# Patient Record
Sex: MALE | Race: BLACK OR AFRICAN AMERICAN | NOT HISPANIC OR LATINO | Employment: OTHER | URBAN - METROPOLITAN AREA
[De-identification: names, ages, dates, MRNs, and addresses within clinical notes are randomized per-mention and may not be internally consistent; named-entity substitution may affect disease eponyms.]

---

## 2017-05-24 ENCOUNTER — HOSPITAL ENCOUNTER (OUTPATIENT)
Dept: LAB | Facility: MEDICAL CENTER | Age: 47
End: 2017-05-24
Attending: INTERNAL MEDICINE
Payer: COMMERCIAL

## 2017-05-24 LAB
ALT SERPL-CCNC: 43 U/L (ref 2–50)
CHOLEST SERPL-MCNC: 118 MG/DL (ref 100–199)
HDLC SERPL-MCNC: 52 MG/DL
LDLC SERPL CALC-MCNC: 54 MG/DL
TRIGL SERPL-MCNC: 58 MG/DL (ref 0–149)

## 2017-05-24 PROCEDURE — 80061 LIPID PANEL: CPT

## 2017-05-24 PROCEDURE — 36415 COLL VENOUS BLD VENIPUNCTURE: CPT

## 2017-05-24 PROCEDURE — 84460 ALANINE AMINO (ALT) (SGPT): CPT

## 2017-10-03 ENCOUNTER — HOSPITAL ENCOUNTER (OUTPATIENT)
Dept: LAB | Facility: MEDICAL CENTER | Age: 47
End: 2017-10-03
Attending: INTERNAL MEDICINE
Payer: COMMERCIAL

## 2017-10-03 LAB — GFR SERPL CREATININE-BSD FRML MDRD: >60 ML/MIN/1.73 M 2

## 2017-10-03 PROCEDURE — 80053 COMPREHEN METABOLIC PANEL: CPT

## 2017-10-03 PROCEDURE — 80061 LIPID PANEL: CPT

## 2017-10-03 PROCEDURE — 83036 HEMOGLOBIN GLYCOSYLATED A1C: CPT

## 2017-10-03 PROCEDURE — 84403 ASSAY OF TOTAL TESTOSTERONE: CPT

## 2017-10-03 PROCEDURE — 85025 COMPLETE CBC W/AUTO DIFF WBC: CPT

## 2017-10-03 PROCEDURE — 84153 ASSAY OF PSA TOTAL: CPT

## 2017-10-03 PROCEDURE — 84402 ASSAY OF FREE TESTOSTERONE: CPT

## 2017-10-03 PROCEDURE — 83704 LIPOPROTEIN BLD QUAN PART: CPT

## 2017-10-03 PROCEDURE — 36415 COLL VENOUS BLD VENIPUNCTURE: CPT

## 2017-10-03 PROCEDURE — 84270 ASSAY OF SEX HORMONE GLOBUL: CPT

## 2017-10-04 LAB
ALBUMIN SERPL BCP-MCNC: 4.4 G/DL (ref 3.2–4.9)
ALBUMIN/GLOB SERPL: 1.4 G/DL
ALP SERPL-CCNC: 47 U/L (ref 30–99)
ALT SERPL-CCNC: 25 U/L (ref 2–50)
ANION GAP SERPL CALC-SCNC: 6 MMOL/L (ref 0–11.9)
AST SERPL-CCNC: 21 U/L (ref 12–45)
BASOPHILS # BLD AUTO: 0.5 % (ref 0–1.8)
BASOPHILS # BLD: 0.02 K/UL (ref 0–0.12)
BILIRUB SERPL-MCNC: 1.6 MG/DL (ref 0.1–1.5)
BUN SERPL-MCNC: 11 MG/DL (ref 8–22)
CALCIUM SERPL-MCNC: 9.7 MG/DL (ref 8.5–10.5)
CHLORIDE SERPL-SCNC: 104 MMOL/L (ref 96–112)
CHOLEST SERPL-MCNC: 161 MG/DL (ref 100–199)
CO2 SERPL-SCNC: 28 MMOL/L (ref 20–33)
CREAT SERPL-MCNC: 0.93 MG/DL (ref 0.5–1.4)
EOSINOPHIL # BLD AUTO: 0.01 K/UL (ref 0–0.51)
EOSINOPHIL NFR BLD: 0.2 % (ref 0–6.9)
ERYTHROCYTE [DISTWIDTH] IN BLOOD BY AUTOMATED COUNT: 40.7 FL (ref 35.9–50)
EST. AVERAGE GLUCOSE BLD GHB EST-MCNC: 114 MG/DL
GLOBULIN SER CALC-MCNC: 3.1 G/DL (ref 1.9–3.5)
GLUCOSE SERPL-MCNC: 88 MG/DL (ref 65–99)
HBA1C MFR BLD: 5.6 % (ref 0–5.6)
HCT VFR BLD AUTO: 49.7 % (ref 42–52)
HDLC SERPL-MCNC: 61 MG/DL
HGB BLD-MCNC: 16.1 G/DL (ref 14–18)
IMM GRANULOCYTES # BLD AUTO: 0 K/UL (ref 0–0.11)
IMM GRANULOCYTES NFR BLD AUTO: 0 % (ref 0–0.9)
LDLC SERPL CALC-MCNC: 92 MG/DL
LYMPHOCYTES # BLD AUTO: 2.42 K/UL (ref 1–4.8)
LYMPHOCYTES NFR BLD: 54.5 % (ref 22–41)
MCH RBC QN AUTO: 28.1 PG (ref 27–33)
MCHC RBC AUTO-ENTMCNC: 32.4 G/DL (ref 33.7–35.3)
MCV RBC AUTO: 86.7 FL (ref 81.4–97.8)
MONOCYTES # BLD AUTO: 0.35 K/UL (ref 0–0.85)
MONOCYTES NFR BLD AUTO: 7.9 % (ref 0–13.4)
NEUTROPHILS # BLD AUTO: 1.64 K/UL (ref 1.82–7.42)
NEUTROPHILS NFR BLD: 36.9 % (ref 44–72)
NRBC # BLD AUTO: 0 K/UL
NRBC BLD AUTO-RTO: 0 /100 WBC
PLATELET # BLD AUTO: 246 K/UL (ref 164–446)
PMV BLD AUTO: 10 FL (ref 9–12.9)
POTASSIUM SERPL-SCNC: 4.6 MMOL/L (ref 3.6–5.5)
PROT SERPL-MCNC: 7.5 G/DL (ref 6–8.2)
PSA SERPL-MCNC: 2.04 NG/ML (ref 0–4)
RBC # BLD AUTO: 5.73 M/UL (ref 4.7–6.1)
SODIUM SERPL-SCNC: 138 MMOL/L (ref 135–145)
TRIGL SERPL-MCNC: 41 MG/DL (ref 0–149)
WBC # BLD AUTO: 4.4 K/UL (ref 4.8–10.8)

## 2017-10-05 LAB
SHBG SERPL-SCNC: 74 NMOL/L (ref 11–80)
TESTOST FREE MFR SERPL: 1.2 % (ref 1.6–2.9)
TESTOST FREE SERPL-MCNC: 99 PG/ML (ref 47–244)
TESTOST SERPL-MCNC: 824 NG/DL (ref 300–890)

## 2017-10-09 LAB
CHOLEST SERPL-MCNC: 171 MG/DL
HDL PARTICAL NO Q4363: ABNORMAL
HDL SIZE Q4361: 9.3 NM
HDLC SERPL-MCNC: 66 MG/DL (ref 40–59)
HLD.LARGE SERPL-SCNC: 7.8 UMOL/L
L VLDL PART NO Q4357: <1.5 NMOL/L
LDL SERPL QN: 21.2 NM
LDL SERPL-SCNC: 1008 NMOL/L
LDL SMALL SERPL-SCNC: 313 NMOL/L
LDLC SERPL CALC-MCNC: 96 MG/DL
TRIGL SERPL-MCNC: 45 MG/DL (ref 30–149)
VLDL SIZE Q4362: ABNORMAL

## 2020-05-15 ENCOUNTER — HOSPITAL ENCOUNTER (OUTPATIENT)
Facility: MEDICAL CENTER | Age: 50
End: 2020-05-15
Attending: FAMILY MEDICINE
Payer: COMMERCIAL

## 2020-05-15 LAB
ALBUMIN SERPL BCP-MCNC: 4.1 G/DL (ref 3.2–4.9)
ALBUMIN/GLOB SERPL: 1.6 G/DL
ALP SERPL-CCNC: 54 U/L (ref 30–99)
ALT SERPL-CCNC: 23 U/L (ref 2–50)
ANION GAP SERPL CALC-SCNC: 14 MMOL/L (ref 7–16)
AST SERPL-CCNC: 25 U/L (ref 12–45)
BILIRUB SERPL-MCNC: 0.9 MG/DL (ref 0.1–1.5)
BUN SERPL-MCNC: 11 MG/DL (ref 8–22)
CALCIUM SERPL-MCNC: 9.2 MG/DL (ref 8.5–10.5)
CHLORIDE SERPL-SCNC: 105 MMOL/L (ref 96–112)
CHOLEST SERPL-MCNC: 138 MG/DL (ref 100–199)
CO2 SERPL-SCNC: 22 MMOL/L (ref 20–33)
CREAT SERPL-MCNC: 0.92 MG/DL (ref 0.5–1.4)
EST. AVERAGE GLUCOSE BLD GHB EST-MCNC: 123 MG/DL
FOLATE SERPL-MCNC: 17.5 NG/ML
GLOBULIN SER CALC-MCNC: 2.5 G/DL (ref 1.9–3.5)
GLUCOSE SERPL-MCNC: 102 MG/DL (ref 65–99)
HBA1C MFR BLD: 5.9 % (ref 0–5.6)
HDLC SERPL-MCNC: 57 MG/DL
LDLC SERPL CALC-MCNC: 70 MG/DL
POTASSIUM SERPL-SCNC: 4.3 MMOL/L (ref 3.6–5.5)
PROT SERPL-MCNC: 6.6 G/DL (ref 6–8.2)
PSA SERPL-MCNC: 6.81 NG/ML (ref 0–4)
SODIUM SERPL-SCNC: 141 MMOL/L (ref 135–145)
TRIGL SERPL-MCNC: 53 MG/DL (ref 0–149)
TSH SERPL DL<=0.005 MIU/L-ACNC: 1.52 UIU/ML (ref 0.38–5.33)
VIT B12 SERPL-MCNC: 1411 PG/ML (ref 211–911)

## 2020-05-15 PROCEDURE — 84153 ASSAY OF PSA TOTAL: CPT

## 2020-05-15 PROCEDURE — 80053 COMPREHEN METABOLIC PANEL: CPT

## 2020-05-15 PROCEDURE — 83721 ASSAY OF BLOOD LIPOPROTEIN: CPT

## 2020-05-15 PROCEDURE — 82746 ASSAY OF FOLIC ACID SERUM: CPT

## 2020-05-15 PROCEDURE — 83036 HEMOGLOBIN GLYCOSYLATED A1C: CPT

## 2020-05-15 PROCEDURE — 82607 VITAMIN B-12: CPT

## 2020-05-15 PROCEDURE — 84443 ASSAY THYROID STIM HORMONE: CPT

## 2020-05-15 PROCEDURE — 80061 LIPID PANEL: CPT

## 2020-05-18 LAB — LDLC SERPL-MCNC: 76 MG/DL (ref 0–129)

## 2020-06-05 ENCOUNTER — HOSPITAL ENCOUNTER (OUTPATIENT)
Dept: LAB | Facility: MEDICAL CENTER | Age: 50
End: 2020-06-05
Attending: UROLOGY
Payer: COMMERCIAL

## 2020-06-05 LAB — PSA SERPL-MCNC: 3.02 NG/ML (ref 0–4)

## 2020-06-05 PROCEDURE — 84153 ASSAY OF PSA TOTAL: CPT

## 2020-07-15 ENCOUNTER — HOSPITAL ENCOUNTER (OUTPATIENT)
Dept: LAB | Facility: MEDICAL CENTER | Age: 50
End: 2020-07-15
Attending: PHYSICIAN ASSISTANT
Payer: COMMERCIAL

## 2020-07-15 ENCOUNTER — HOSPITAL ENCOUNTER (OUTPATIENT)
Dept: LAB | Facility: MEDICAL CENTER | Age: 50
End: 2020-07-15
Attending: UROLOGY
Payer: COMMERCIAL

## 2020-07-15 LAB
ANION GAP SERPL CALC-SCNC: 11 MMOL/L (ref 7–16)
BASOPHILS # BLD AUTO: 0.2 % (ref 0–1.8)
BASOPHILS # BLD: 0.01 K/UL (ref 0–0.12)
BUN SERPL-MCNC: 13 MG/DL (ref 8–22)
CALCIUM SERPL-MCNC: 9.4 MG/DL (ref 8.5–10.5)
CHLORIDE SERPL-SCNC: 107 MMOL/L (ref 96–112)
CO2 SERPL-SCNC: 25 MMOL/L (ref 20–33)
CREAT SERPL-MCNC: 1.21 MG/DL (ref 0.5–1.4)
EOSINOPHIL # BLD AUTO: 0.05 K/UL (ref 0–0.51)
EOSINOPHIL NFR BLD: 1.1 % (ref 0–6.9)
ERYTHROCYTE [DISTWIDTH] IN BLOOD BY AUTOMATED COUNT: 45.6 FL (ref 35.9–50)
GLUCOSE SERPL-MCNC: 81 MG/DL (ref 65–99)
HCT VFR BLD AUTO: 48.3 % (ref 42–52)
HGB BLD-MCNC: 15.5 G/DL (ref 14–18)
IMM GRANULOCYTES # BLD AUTO: 0 K/UL (ref 0–0.11)
IMM GRANULOCYTES NFR BLD AUTO: 0 % (ref 0–0.9)
LYMPHOCYTES # BLD AUTO: 2.14 K/UL (ref 1–4.8)
LYMPHOCYTES NFR BLD: 48.1 % (ref 22–41)
MCH RBC QN AUTO: 28.9 PG (ref 27–33)
MCHC RBC AUTO-ENTMCNC: 32.1 G/DL (ref 33.7–35.3)
MCV RBC AUTO: 90.1 FL (ref 81.4–97.8)
MONOCYTES # BLD AUTO: 0.5 K/UL (ref 0–0.85)
MONOCYTES NFR BLD AUTO: 11.2 % (ref 0–13.4)
NEUTROPHILS # BLD AUTO: 1.75 K/UL (ref 1.82–7.42)
NEUTROPHILS NFR BLD: 39.4 % (ref 44–72)
NRBC # BLD AUTO: 0 K/UL
NRBC BLD-RTO: 0 /100 WBC
PLATELET # BLD AUTO: 244 K/UL (ref 164–446)
PMV BLD AUTO: 10.5 FL (ref 9–12.9)
POTASSIUM SERPL-SCNC: 4.4 MMOL/L (ref 3.6–5.5)
RBC # BLD AUTO: 5.36 M/UL (ref 4.7–6.1)
SODIUM SERPL-SCNC: 143 MMOL/L (ref 135–145)
WBC # BLD AUTO: 4.5 K/UL (ref 4.8–10.8)

## 2020-07-15 PROCEDURE — 87086 URINE CULTURE/COLONY COUNT: CPT

## 2020-07-15 PROCEDURE — 80048 BASIC METABOLIC PNL TOTAL CA: CPT

## 2020-07-15 PROCEDURE — 85025 COMPLETE CBC W/AUTO DIFF WBC: CPT

## 2020-07-18 LAB
BACTERIA UR CULT: NORMAL
SIGNIFICANT IND 70042: NORMAL
SITE SITE: NORMAL
SOURCE SOURCE: NORMAL

## 2020-07-21 ENCOUNTER — HOSPITAL ENCOUNTER (OUTPATIENT)
Facility: MEDICAL CENTER | Age: 50
End: 2020-07-21
Attending: FAMILY MEDICINE
Payer: COMMERCIAL

## 2020-07-21 LAB
AMBIGUOUS DTTM AMBI4: NORMAL
APTT PPP: 29.7 SEC (ref 24.7–36)
INR PPP: 1 (ref 0.87–1.13)
PROTHROMBIN TIME: 13.5 SEC (ref 12–14.6)

## 2020-07-21 PROCEDURE — 85730 THROMBOPLASTIN TIME PARTIAL: CPT

## 2020-07-21 PROCEDURE — 85610 PROTHROMBIN TIME: CPT

## 2020-10-21 ENCOUNTER — HOSPITAL ENCOUNTER (OUTPATIENT)
Facility: MEDICAL CENTER | Age: 50
End: 2020-10-21
Attending: EMERGENCY MEDICINE
Payer: COMMERCIAL

## 2020-10-21 LAB
AMBIGUOUS DTTM AMBI4: NORMAL
BASOPHILS # BLD AUTO: 0.2 % (ref 0–1.8)
BASOPHILS # BLD: 0.01 K/UL (ref 0–0.12)
EOSINOPHIL # BLD AUTO: 0.06 K/UL (ref 0–0.51)
EOSINOPHIL NFR BLD: 1.4 % (ref 0–6.9)
ERYTHROCYTE [DISTWIDTH] IN BLOOD BY AUTOMATED COUNT: 44.5 FL (ref 35.9–50)
HCT VFR BLD AUTO: 48.1 % (ref 42–52)
HGB BLD-MCNC: 15.1 G/DL (ref 14–18)
IMM GRANULOCYTES # BLD AUTO: 0.01 K/UL (ref 0–0.11)
IMM GRANULOCYTES NFR BLD AUTO: 0.2 % (ref 0–0.9)
LYMPHOCYTES # BLD AUTO: 2.46 K/UL (ref 1–4.8)
LYMPHOCYTES NFR BLD: 56.6 % (ref 22–41)
MCH RBC QN AUTO: 28.3 PG (ref 27–33)
MCHC RBC AUTO-ENTMCNC: 31.4 G/DL (ref 33.7–35.3)
MCV RBC AUTO: 90.2 FL (ref 81.4–97.8)
MONOCYTES # BLD AUTO: 0.49 K/UL (ref 0–0.85)
MONOCYTES NFR BLD AUTO: 11.3 % (ref 0–13.4)
NEUTROPHILS # BLD AUTO: 1.32 K/UL (ref 1.82–7.42)
NEUTROPHILS NFR BLD: 30.3 % (ref 44–72)
NRBC # BLD AUTO: 0 K/UL
NRBC BLD-RTO: 0 /100 WBC
PLATELET # BLD AUTO: 231 K/UL (ref 164–446)
PMV BLD AUTO: 10.7 FL (ref 9–12.9)
RBC # BLD AUTO: 5.33 M/UL (ref 4.7–6.1)
WBC # BLD AUTO: 4.4 K/UL (ref 4.8–10.8)

## 2020-10-21 PROCEDURE — 83090 ASSAY OF HOMOCYSTEINE: CPT

## 2020-10-21 PROCEDURE — 84100 ASSAY OF PHOSPHORUS: CPT

## 2020-10-21 PROCEDURE — 82670 ASSAY OF TOTAL ESTRADIOL: CPT

## 2020-10-21 PROCEDURE — 84403 ASSAY OF TOTAL TESTOSTERONE: CPT

## 2020-10-21 PROCEDURE — 80053 COMPREHEN METABOLIC PANEL: CPT

## 2020-10-21 PROCEDURE — 84270 ASSAY OF SEX HORMONE GLOBUL: CPT

## 2020-10-21 PROCEDURE — 83615 LACTATE (LD) (LDH) ENZYME: CPT

## 2020-10-21 PROCEDURE — 84481 FREE ASSAY (FT-3): CPT

## 2020-10-21 PROCEDURE — 83525 ASSAY OF INSULIN: CPT

## 2020-10-21 PROCEDURE — 80061 LIPID PANEL: CPT

## 2020-10-21 PROCEDURE — 84402 ASSAY OF FREE TESTOSTERONE: CPT

## 2020-10-21 PROCEDURE — 84439 ASSAY OF FREE THYROXINE: CPT

## 2020-10-21 PROCEDURE — 82977 ASSAY OF GGT: CPT

## 2020-10-21 PROCEDURE — 82533 TOTAL CORTISOL: CPT

## 2020-10-21 PROCEDURE — 84550 ASSAY OF BLOOD/URIC ACID: CPT

## 2020-10-21 PROCEDURE — 85025 COMPLETE CBC W/AUTO DIFF WBC: CPT

## 2020-10-21 PROCEDURE — 84443 ASSAY THYROID STIM HORMONE: CPT

## 2020-10-21 PROCEDURE — 83036 HEMOGLOBIN GLYCOSYLATED A1C: CPT

## 2020-10-21 PROCEDURE — 84153 ASSAY OF PSA TOTAL: CPT

## 2020-10-21 PROCEDURE — 82306 VITAMIN D 25 HYDROXY: CPT

## 2020-10-21 PROCEDURE — 82627 DEHYDROEPIANDROSTERONE: CPT

## 2020-10-21 PROCEDURE — 86141 C-REACTIVE PROTEIN HS: CPT

## 2020-10-21 PROCEDURE — 84305 ASSAY OF SOMATOMEDIN: CPT

## 2020-10-22 LAB
25(OH)D3 SERPL-MCNC: 44 NG/ML (ref 30–100)
ALBUMIN SERPL BCP-MCNC: 4.2 G/DL (ref 3.2–4.9)
ALBUMIN/GLOB SERPL: 1.6 G/DL
ALP SERPL-CCNC: 62 U/L (ref 30–99)
ALT SERPL-CCNC: 25 U/L (ref 2–50)
ANION GAP SERPL CALC-SCNC: 10 MMOL/L (ref 7–16)
AST SERPL-CCNC: 23 U/L (ref 12–45)
BILIRUB SERPL-MCNC: 0.9 MG/DL (ref 0.1–1.5)
BUN SERPL-MCNC: 13 MG/DL (ref 8–22)
CALCIUM SERPL-MCNC: 9 MG/DL (ref 8.5–10.5)
CHLORIDE SERPL-SCNC: 104 MMOL/L (ref 96–112)
CHOLEST SERPL-MCNC: 150 MG/DL (ref 100–199)
CO2 SERPL-SCNC: 23 MMOL/L (ref 20–33)
CORTIS SERPL-MCNC: 16.6 UG/DL (ref 0–23)
CREAT SERPL-MCNC: 0.92 MG/DL (ref 0.5–1.4)
CRP SERPL HS-MCNC: 2.8 MG/L (ref 0–7.5)
DHEA-S SERPL-MCNC: 172 UG/DL (ref 44.3–331)
EST. AVERAGE GLUCOSE BLD GHB EST-MCNC: 126 MG/DL
ESTRADIOL SERPL-MCNC: 23.9 PG/ML
FASTING STATUS PATIENT QL REPORTED: NORMAL
GGT SERPL-CCNC: 26 U/L (ref 7–51)
GLOBULIN SER CALC-MCNC: 2.6 G/DL (ref 1.9–3.5)
GLUCOSE SERPL-MCNC: 95 MG/DL (ref 65–99)
HBA1C MFR BLD: 6 % (ref 0–5.6)
HCYS SERPL-SCNC: 8.01 UMOL/L
HDLC SERPL-MCNC: 52 MG/DL
LDH SERPL L TO P-CCNC: 218 U/L (ref 107–266)
LDLC SERPL CALC-MCNC: 79 MG/DL
PHOSPHATE SERPL-MCNC: 3.6 MG/DL (ref 2.5–4.5)
POTASSIUM SERPL-SCNC: 4.1 MMOL/L (ref 3.6–5.5)
PROT SERPL-MCNC: 6.8 G/DL (ref 6–8.2)
PSA SERPL-MCNC: 1.61 NG/ML (ref 0–4)
SODIUM SERPL-SCNC: 137 MMOL/L (ref 135–145)
T3FREE SERPL-MCNC: 3.51 PG/ML (ref 2–4.4)
T4 FREE SERPL-MCNC: 1.32 NG/DL (ref 0.93–1.7)
TRIGL SERPL-MCNC: 93 MG/DL (ref 0–149)
TSH SERPL DL<=0.005 MIU/L-ACNC: 1.42 UIU/ML (ref 0.38–5.33)
URATE SERPL-MCNC: 4.7 MG/DL (ref 2.5–8.3)

## 2020-10-23 LAB
SHBG SERPL-SCNC: 53 NMOL/L (ref 11–80)
TESTOST FREE MFR SERPL: 1.5 % (ref 1.6–2.9)
TESTOST FREE SERPL-MCNC: 90 PG/ML (ref 47–244)
TESTOST SERPL-MCNC: 617 NG/DL (ref 300–890)

## 2020-10-24 LAB — INSULIN P FAST SERPL-ACNC: 4 UIU/ML (ref 3–19)

## 2020-10-25 LAB
IGF-I SERPL-MCNC: 192 NG/ML (ref 65–222)
IGF-I Z-SCORE SERPL: 1.3

## 2021-01-17 ENCOUNTER — APPOINTMENT (OUTPATIENT)
Dept: RADIOLOGY | Facility: MEDICAL CENTER | Age: 51
End: 2021-01-17
Attending: EMERGENCY MEDICINE
Payer: COMMERCIAL

## 2021-01-17 ENCOUNTER — HOSPITAL ENCOUNTER (OUTPATIENT)
Dept: RADIOLOGY | Facility: MEDICAL CENTER | Age: 51
End: 2021-01-17
Payer: COMMERCIAL

## 2021-01-17 ENCOUNTER — HOSPITAL ENCOUNTER (EMERGENCY)
Facility: MEDICAL CENTER | Age: 51
End: 2021-01-17
Attending: EMERGENCY MEDICINE
Payer: COMMERCIAL

## 2021-01-17 VITALS
OXYGEN SATURATION: 100 % | SYSTOLIC BLOOD PRESSURE: 138 MMHG | RESPIRATION RATE: 16 BRPM | DIASTOLIC BLOOD PRESSURE: 91 MMHG | TEMPERATURE: 97.2 F | HEART RATE: 51 BPM | WEIGHT: 180.12 LBS

## 2021-01-17 DIAGNOSIS — S62.101A CLOSED FRACTURE OF RIGHT WRIST, INITIAL ENCOUNTER: ICD-10-CM
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
## 2021-01-17 DIAGNOSIS — R45.2 UNHAPPINESS: ICD-10-CM

## 2021-01-17 PROCEDURE — 700102 HCHG RX REV CODE 250 W/ 637 OVERRIDE(OP): Performed by: EMERGENCY MEDICINE

## 2021-01-17 PROCEDURE — 25605 CLTX DST RDL FX/EPHYS SEP W/: CPT

## 2021-01-17 PROCEDURE — 700101 HCHG RX REV CODE 250: Performed by: EMERGENCY MEDICINE

## 2021-01-17 PROCEDURE — A9270 NON-COVERED ITEM OR SERVICE: HCPCS | Performed by: EMERGENCY MEDICINE

## 2021-01-17 PROCEDURE — 99284 EMERGENCY DEPT VISIT MOD MDM: CPT

## 2021-01-17 PROCEDURE — 73200 CT UPPER EXTREMITY W/O DYE: CPT | Mod: RT

## 2021-01-17 PROCEDURE — 302875 HCHG BANDAGE ACE 4 OR 6""

## 2021-01-17 PROCEDURE — 73110 X-RAY EXAM OF WRIST: CPT | Mod: RT

## 2021-01-17 RX ORDER — HYDROCODONE BITARTRATE AND ACETAMINOPHEN 5; 325 MG/1; MG/1
1 TABLET ORAL EVERY 6 HOURS PRN
Qty: 20 TAB | Refills: 0 | Status: SHIPPED | OUTPATIENT
Start: 2021-01-17 | End: 2021-01-22

## 2021-01-17 RX ORDER — OXYCODONE HYDROCHLORIDE AND ACETAMINOPHEN 5; 325 MG/1; MG/1
1 TABLET ORAL ONCE
Status: COMPLETED | OUTPATIENT
Start: 2021-01-17 | End: 2021-01-17

## 2021-01-17 RX ORDER — LIDOCAINE HYDROCHLORIDE 20 MG/ML
20 INJECTION, SOLUTION INFILTRATION; PERINEURAL ONCE
Status: COMPLETED | OUTPATIENT
Start: 2021-01-17 | End: 2021-01-17

## 2021-01-17 RX ADMIN — OXYCODONE AND ACETAMINOPHEN 1 TABLET: 5; 325 TABLET ORAL at 16:32

## 2021-01-17 RX ADMIN — LIDOCAINE HYDROCHLORIDE 20 ML: 20 INJECTION, SOLUTION INFILTRATION; PERINEURAL at 17:00

## 2021-01-17 ASSESSMENT — LIFESTYLE VARIABLES: DO YOU DRINK ALCOHOL: NO

## 2021-01-17 ASSESSMENT — FIBROSIS 4 INDEX: FIB4 SCORE: 1.04

## 2021-01-17 NOTE — ED TRIAGE NOTES
Pt comes in complaining of R wrist pain. Pt stating he was skiing and injured his wrist. Deformity and swelling noted.

## 2021-01-17 NOTE — ED PROVIDER NOTES
ED Provider Note    CHIEF COMPLAINT  Chief Complaint   Patient presents with   • Wrist Pain       HPI  Bhargav Pozo is a 50 y.o. male who presents to the emergency department complaint of right wrist pain and deformity.  The patient was snowboarding and caught an edge and fell on outstretched right wrist.  He was wearing a helmet, he did not hit his head or get knocked out.  Denies any injuries neck, chest, abdomen, back or other extremities.  He is not on blood thinners.  He complains of pain swelling and deformity to the right wrist.  No numbness or tingling distally.  No other injuries or complaints.  His family member who is present at bedside is an x-ray tech at the doctor's office and they took 2 view x-rays before he came here.  He has a distal radius fracture with displacement.      REVIEW OF SYSTEMS  See HPI for further details. All other systems are negative.    PAST MEDICAL HISTORY  History reviewed. No pertinent past medical history.    FAMILY HISTORY  History reviewed. No pertinent family history.    SOCIAL HISTORY  Social History     Socioeconomic History   • Marital status:      Spouse name: Not on file   • Number of children: Not on file   • Years of education: Not on file   • Highest education level: Not on file   Occupational History   • Not on file   Social Needs   • Financial resource strain: Not on file   • Food insecurity     Worry: Not on file     Inability: Not on file   • Transportation needs     Medical: Not on file     Non-medical: Not on file   Tobacco Use   • Smoking status: Never Smoker   Substance and Sexual Activity   • Alcohol use: Not Currently   • Drug use: Not Currently   • Sexual activity: Not on file   Lifestyle   • Physical activity     Days per week: Not on file     Minutes per session: Not on file   • Stress: Not on file   Relationships   • Social connections     Talks on phone: Not on file     Gets together: Not on file     Attends Buddhist service: Not on  file     Active member of club or organization: Not on file     Attends meetings of clubs or organizations: Not on file     Relationship status: Not on file   • Intimate partner violence     Fear of current or ex partner: Not on file     Emotionally abused: Not on file     Physically abused: Not on file     Forced sexual activity: Not on file   Other Topics Concern   • Not on file   Social History Narrative   • Not on file       SURGICAL HISTORY  History reviewed. No pertinent surgical history.    CURRENT MEDICATIONS  Home Medications     Reviewed by Marlys Ceja R.N. (Registered Nurse) on 01/17/21 at 1454  Med List Status: Partial   Medication Last Dose Status   Tamsulosin HCl (FLOMAX PO)  Active                ALLERGIES  Allergies   Allergen Reactions   • Codeine        PHYSICAL EXAM  VITAL SIGNS: /94   Pulse (!) 50   Temp 36.2 °C (97.2 °F) (Temporal)   Resp 18   Wt 81.7 kg (180 lb 1.9 oz)   SpO2 98%    Constitutional: Well developed, Well nourished, No acute distress, Non-toxic appearance.   HENT: Normocephalic, Atraumatic  Eyes: PERRL, EOMI, Conjunctiva normal, No discharge.   Neck: Normal range of motion  Cardiovascular: Chest is nontender to palpation  Thorax & Lungs: Normal breath sounds, No respiratory distress  Abdomen: Bowel sounds normal, Soft, No tenderness,  Skin: Warm, Dry, No erythema, No rash.   Back: No tenderness, No CVA tenderness.   Musculoskeletal: Good range of motion in all major joints.  Right wrist shows a distal dorsal and radial angulated fracture.  The skin is intact.  Normal neurovascular exam.  There is some diffuse vague numbness.  Fingers are warm and perfused.  Neurologic: Alert, No focal deficits noted.   Psychiatric: Affect normal         RADIOLOGY/PROCEDURES  CT-WRIST W/O RIGHT   Final Result      Impacted, intra-articular, mildly dorsally displaced fracture of the distal radius.      Displaced fracture of the ulna styloid.      Soft tissue swelling.          DX-WRIST-COMPLETE 3+ RIGHT   Final Result      Improved alignment of the distal radial fracture. There continues to be mild dorsal displacement.      Mildly displaced ulna styloid fracture.      Soft tissue swelling.         OUTSIDE IMAGES-DX UPPER EXTREMITY, RIGHT   Final Result            COURSE & MEDICAL DECISION MAKING  Pertinent Labs & Imaging studies reviewed. (See chart for details)    1530 :The patient presents emergency department with a displaced fracture of the right wrist.  The patient had a crash while snowboarding.  Appears otherwise uninjured  Head neck chest abdomen pelvis demonstrating signs of trauma.  Other extremities unremarkable.  Family presents with a CD of an x-ray taken at the clinic where the wife works.  This will be brought over to the film room for review.  Once this is loaded I will review this.  Did order some repeat films to see if this would be more expeditious.  These are pending.     1600: X-ray done in the clinic was provided by the family was available for review.  Which was loaded up and shows a fracture of the distal radius which is significantly displaced both dorsally and radially.  This will require reduction.  I called the orthopedic doctor determine if he would like to reduce this work if he would like me to reduce it.    1622 the orthopedic surgeon called back.  He would like me to reduce it.  I have ordered some pain medications orally prior to that I have ordered some lidocaine after my discussion with the orthopedic surgeon.  I have consented the patient for reduction.  I have discussed the case with orthopedic PA to assist me in this reduction.  He arrived in the ER promptly      1646: Procedure note: Closed reduction  Informed consent was obtained with risk benefits complications alternatives discussed including failed procedure requiring rereduction, infection, bleeding, or possible neurovascular compromise.  Verbalized understanding was agreeable to the  procedure.    Dorsum of the wrist was prepped with ChloraPrep x3.  Then total of 10 cc lidocaine without epinephrine was injected and hematoma.  Good anesthesia was obtained.    The wrist was reduced in typical fashion took a couple of attempts to get adequately straighten out to length.  Then a splint was applied with myself in the orthopedic PA with assistance.  Repeat neurovascular exam shows good perfusion and intact sensation.     1735: Repeat x-ray shows improved alignment.  Orthopedics is requesting a CT scan of the wrist.  CT scan of the wrist is ordered per orthopedic surgeon's request.  He will see the patient in follow-up.  I have reviewed the postreduction film with the orthopedic surgeon.    1900 CT of the wrist completed and is is available for review the patient is reassessed and having some paresthesias in the ulnar nerve distribution.  The CT is not yet read and finalized by the radiologist.  I have reviewed the CT waiting for radiology read.  Preparing his discharge paperwork in anticipation of being discharged.    I have reassessed the patient and his plan preparing his discharge paperwork and prescriptions and notices change in his neurologic status.  He has intact two-point discrimination despite the median nerve are normal sensation but decreased two-point discrimination and sensation in the distribution of the ulnar nerve.  I called the orthopedic surgeon at 1935.    Orthopedic surgeon responds states he will come in to see the patient but he would like me to loosen the splint.  He will see the patient.  Foot is loosened.      The patient was apparently seen by the orthopedic surgeon around 2035.  But he did not talk to me.  He left a note in the chart.  This was around 2050.  The note was reviewed the patient was cleared for discharge from his perspective.  He felt the patient could go home and follow-up.      Approximately 9 PM I went to reassess the patient and I was made aware that he had  rewrapped the Ace wrap and left in the emergency department because he was frustrated with the amount of time he spent here.  He was eloped from the ER.  He did not receive discharge instructions, or prescriptions for pain medications.    When I noticed the patient was gone around 9 PM I called him and asked him to return to the ER so we could rewrap his Ace wrap.  Explained I would like to prescribe him pain medications and given appropriate follow-up information.      The patient's case rapidly had been applied by them was very tight and poorly fitting unclear if the cast or splint had become loose or shifted.  He is requesting to leave.  I had the tech appropriately rewrapped the splint.    He is prescribed narcotics.  He is consented for narcotics.    In prescribing controlled substances to this patient, I certify that I have obtained and reviewed the medical history of Bhargav Pozo. I have also made a good jim effort to obtain applicable records from other providers who have treated the patient and records did not demonstrate any increased risk of substance abuse that would prevent me from prescribing controlled substances.     I have conducted a physical exam and documented it. I have reviewed Mr. Pozo’s prescription history as maintained by the [place] Prescription Monitoring Program.     I have assessed the patient’s risk for abuse, dependency, and addiction using the validated Opioid Risk Tool available at https://www.mdcalc.com/nhvpml-paxw-eaiw-ort-narcotic-abuse.     Given the above, I believe the benefits of controlled substance therapy outweigh the risks. The reasons for prescribing controlled substances include.  In my opinion narcotics are the only reasonable choice for pain control secondary to his fracture.  Accordingly, I have discussed the risk and benefits, treatment plan, and alternative therapies with the patient.       At this point the patient was consented for narcotics he signed  the consent form he understands a follow-up instructions.  He understands it is return for pain, numbness and weakness or other concerns.  Transfer likely need surgery presenting by orthopedics and is given appropriate discharge instructions.    Mendoza Farley M.D.  [address]  [address]  [place] [address]  [phone]    Schedule an appointment as soon as possible for a visit in 2 days          FINAL IMPRESSION  1. Closed fracture of right wrist, initial encounter  HYDROcodone-acetaminophen (NORCO) 5-325 MG Tab per tablet   2. Snowboard accident, initial encounter     3. Situational anxiety     4. Unhappiness     5.  Eloped from the ER    Addendum: was reassessed in the splint reports no paresthesia and had normal cap refill at the time of discharge.           Electronically signed by: Tho Morrison M.D., 1/17/2021 3:09 PM

## 2021-01-18 NOTE — ED NOTES
Pt seen leaving treatment room and head to ER lobby. Er tech stated pt did not want to wait for splint to be re-wrapped.

## 2021-01-18 NOTE — HOSPICE
Orthopaedic Consult Note      Bhargav Pozo is a 50 y.o. right-hand-dominant male who presents after a snowboarding accident.  He indicates that he was at [facility] today snowboarding when he fell onto an outstretched right hand.  Had immediate wrist pain.  Indicates that he has broke his wrist in the past.  Currently has intermittent numbness over the ring and small finger.  Otherwise denies any numbness and tingling into his middle index or thumb.  Denies much in the way of any motor trouble with his fingers at this point time.  Did feel like the splint was too tight at one point time but this is been loosened.  Denies chest pain shortness breath fever chills night sweats unintentional weight loss    History reviewed. No pertinent past medical history.    History reviewed. No pertinent surgical history.    Medications  No current facility-administered medications on file prior to encounter.      Current Outpatient Medications on File Prior to Encounter   Medication Sig Dispense Refill   • Tamsulosin HCl (FLOMAX PO) Take  by mouth.         Allergies  Codeine    ROS  All other systems were reviewed and found to be negative    History reviewed. No pertinent family history.    Social History     Socioeconomic History   • Marital status:      Spouse name: Not on file   • Number of children: Not on file   • Years of education: Not on file   • Highest education level: Not on file   Occupational History   • Not on file   Social Needs   • Financial resource strain: Not on file   • Food insecurity     Worry: Not on file     Inability: Not on file   • Transportation needs     Medical: Not on file     Non-medical: Not on file   Tobacco Use   • Smoking status: Never Smoker   Substance and Sexual Activity   • Alcohol use: Not Currently   • Drug use: Not Currently   • Sexual activity: Not on file   Lifestyle   • Physical activity     Days per week: Not on file     Minutes per session: Not on file   • Stress: Not on  file   Relationships   • Social connections     Talks on phone: Not on file     Gets together: Not on file     Attends Jew service: Not on file     Active member of club or organization: Not on file     Attends meetings of clubs or organizations: Not on file     Relationship status: Not on file   • Intimate partner violence     Fear of current or ex partner: Not on file     Emotionally abused: Not on file     Physically abused: Not on file     Forced sexual activity: Not on file   Other Topics Concern   • Not on file   Social History Narrative   • Not on file       Physical Exam  Vitals  /93   Pulse (!) 56   Temp 36.2 °C (97.2 °F) (Temporal)   Resp 18   Wt 81.7 kg (180 lb 1.9 oz)   SpO2 98%   General: No acute distress alert and oriented x3 cooperative breathing room air  Skin: Intact, no open wounds unless otherwise noted below  Extremities: Right upper extremity: Patient is in a splint currently.  He denies any tenderness to palpation in the elbow or shoulder.  No pain with range of motion of the shoulder.  Currently he is able to fire [employer] interosseous musculature.  Sensation is intact light touch median radial nerves.  He does have slightly diminished sensation but it is still intact over the ulnar nerve most pronounced on dorsal aspect of the fingers.  All of his fingers are warm well perfused    Radiographs:  CT-WRIST W/O RIGHT   Final Result      Impacted, intra-articular, mildly dorsally displaced fracture of the distal radius.      Displaced fracture of the ulna styloid.      Soft tissue swelling.         DX-WRIST-COMPLETE 3+ RIGHT   Final Result      Improved alignment of the distal radial fracture. There continues to be mild dorsal displacement.      Mildly displaced ulna styloid fracture.      Soft tissue swelling.         OUTSIDE IMAGES-DX UPPER EXTREMITY, RIGHT   Final Result          Laboratory Values      No results for input(s): SODIUM, POTASSIUM, CHLORIDE, CO2, GLUCOSE, BUN,  CPKTOTAL in the last 72 hours.          Impression:    1.  Right distal radius fracture    Plan:    At this point I am not concerned with any ominous neuropraxia's or emerging neurological issues.  His ulnar sensation seems to be resolving according to him and he felt that his splint was too tight when it was placed.  It is still intact but slightly diminished over the dorsal aspect.  His interosseous function is good at this point time.  Because of this he is welcome to follow-up with me this week in office.  He should remain nonweightbearing in the splint at all times with the extremity elevated to help decrease swelling.  Call for an appointment

## 2021-01-18 NOTE — DISCHARGE INSTRUCTIONS
Keep your arm in the splint, use sling for comfort.  No driving on pain meds were explained.  Return for pain or other concerns.

## 2021-01-18 NOTE — ED NOTES
MD aware pt left before treatment complete and called pt via phone. Pt to return to have splint re-wrapped.    same name as above

## 2021-01-18 NOTE — ED NOTES
Pt and pt visitor expressed frustrations over extended treatment time. This RN explained to visitor that eta for consulting physician is unknown and asked if there was anything staff could do to help with the wait times. Pt visitor declined at this time.

## 2021-01-18 NOTE — ED NOTES
Pt c/o numbness to 4th and 5th digits on right hand post-splint. MD in room to re-eval. Ace wrap re-wrapped w/ looser fitting.

## 2021-01-18 NOTE — ED NOTES
Pt given dc instruction but splint needs re-wrapping. MD notified - ortho doc to possibly return and re-wrap.

## 2021-01-21 ENCOUNTER — PRE-ADMISSION TESTING (OUTPATIENT)
Dept: ADMISSIONS | Facility: MEDICAL CENTER | Age: 51
End: 2021-01-21
Attending: ORTHOPAEDIC SURGERY
Payer: COMMERCIAL

## 2021-01-21 RX ORDER — ATORVASTATIN CALCIUM 10 MG/1
10 TABLET, FILM COATED ORAL DAILY
COMMUNITY
Start: 2020-10-27 | End: 2022-01-14 | Stop reason: SDUPTHER

## 2021-01-21 RX ORDER — IBUPROFEN 400 MG/1
400 TABLET ORAL EVERY 6 HOURS PRN
Status: ON HOLD | COMMUNITY
End: 2021-01-26

## 2021-01-22 ENCOUNTER — PRE-ADMISSION TESTING (OUTPATIENT)
Dept: ADMISSIONS | Facility: MEDICAL CENTER | Age: 51
End: 2021-01-22
Attending: ORTHOPAEDIC SURGERY
Payer: COMMERCIAL

## 2021-01-22 DIAGNOSIS — Z01.812 PRE-OPERATIVE LABORATORY EXAMINATION: ICD-10-CM

## 2021-01-22 LAB
COVID ORDER STATUS COVID19: NORMAL
SARS-COV-2 RNA RESP QL NAA+PROBE: NOTDETECTED
SPECIMEN SOURCE: NORMAL

## 2021-01-22 PROCEDURE — C9803 HOPD COVID-19 SPEC COLLECT: HCPCS

## 2021-01-22 PROCEDURE — U0003 INFECTIOUS AGENT DETECTION BY NUCLEIC ACID (DNA OR RNA); SEVERE ACUTE RESPIRATORY SYNDROME CORONAVIRUS 2 (SARS-COV-2) (CORONAVIRUS DISEASE [COVID-19]), AMPLIFIED PROBE TECHNIQUE, MAKING USE OF HIGH THROUGHPUT TECHNOLOGIES AS DESCRIBED BY CMS-2020-01-R: HCPCS

## 2021-01-22 PROCEDURE — U0005 INFEC AGEN DETEC AMPLI PROBE: HCPCS

## 2021-01-26 ENCOUNTER — HOSPITAL ENCOUNTER (OUTPATIENT)
Facility: MEDICAL CENTER | Age: 51
End: 2021-01-26
Attending: ORTHOPAEDIC SURGERY | Admitting: ORTHOPAEDIC SURGERY
Payer: COMMERCIAL

## 2021-01-26 ENCOUNTER — APPOINTMENT (OUTPATIENT)
Dept: RADIOLOGY | Facility: MEDICAL CENTER | Age: 51
End: 2021-01-26
Attending: ORTHOPAEDIC SURGERY
Payer: COMMERCIAL

## 2021-01-26 ENCOUNTER — ANESTHESIA (OUTPATIENT)
Dept: SURGERY | Facility: MEDICAL CENTER | Age: 51
End: 2021-01-26
Payer: COMMERCIAL

## 2021-01-26 ENCOUNTER — ANESTHESIA EVENT (OUTPATIENT)
Dept: SURGERY | Facility: MEDICAL CENTER | Age: 51
End: 2021-01-26
Payer: COMMERCIAL

## 2021-01-26 VITALS
TEMPERATURE: 97.5 F | OXYGEN SATURATION: 99 % | WEIGHT: 180.78 LBS | DIASTOLIC BLOOD PRESSURE: 88 MMHG | SYSTOLIC BLOOD PRESSURE: 138 MMHG | HEIGHT: 70 IN | RESPIRATION RATE: 14 BRPM | BODY MASS INDEX: 25.88 KG/M2 | HEART RATE: 64 BPM

## 2021-01-26 PROBLEM — E78.5 HYPERLIPIDEMIA: Status: ACTIVE | Noted: 2021-01-26

## 2021-01-26 PROBLEM — R11.2 PONV (POSTOPERATIVE NAUSEA AND VOMITING): Status: ACTIVE | Noted: 2021-01-26

## 2021-01-26 PROBLEM — Z98.890 PONV (POSTOPERATIVE NAUSEA AND VOMITING): Status: ACTIVE | Noted: 2021-01-26

## 2021-01-26 PROCEDURE — 64417 NJX AA&/STRD AX NERVE IMG: CPT | Performed by: ORTHOPAEDIC SURGERY

## 2021-01-26 PROCEDURE — 502000 HCHG MISC OR IMPLANTS RC 0278: Performed by: ORTHOPAEDIC SURGERY

## 2021-01-26 PROCEDURE — 160002 HCHG RECOVERY MINUTES (STAT): Performed by: ORTHOPAEDIC SURGERY

## 2021-01-26 PROCEDURE — 700101 HCHG RX REV CODE 250: Performed by: ORTHOPAEDIC SURGERY

## 2021-01-26 PROCEDURE — 73100 X-RAY EXAM OF WRIST: CPT | Mod: RT

## 2021-01-26 PROCEDURE — 502576 HCHG PACK, HAND: Performed by: ORTHOPAEDIC SURGERY

## 2021-01-26 PROCEDURE — 700105 HCHG RX REV CODE 258: Performed by: ORTHOPAEDIC SURGERY

## 2021-01-26 PROCEDURE — 160048 HCHG OR STATISTICAL LEVEL 1-5: Performed by: ORTHOPAEDIC SURGERY

## 2021-01-26 PROCEDURE — A6223 GAUZE >16<=48 NO W/SAL W/O B: HCPCS | Performed by: ORTHOPAEDIC SURGERY

## 2021-01-26 PROCEDURE — 160039 HCHG SURGERY MINUTES - EA ADDL 1 MIN LEVEL 3: Performed by: ORTHOPAEDIC SURGERY

## 2021-01-26 PROCEDURE — 700101 HCHG RX REV CODE 250: Performed by: ANESTHESIOLOGY

## 2021-01-26 PROCEDURE — C1713 ANCHOR/SCREW BN/BN,TIS/BN: HCPCS | Performed by: ORTHOPAEDIC SURGERY

## 2021-01-26 PROCEDURE — A9270 NON-COVERED ITEM OR SERVICE: HCPCS | Performed by: ANESTHESIOLOGY

## 2021-01-26 PROCEDURE — 160028 HCHG SURGERY MINUTES - 1ST 30 MINS LEVEL 3: Performed by: ORTHOPAEDIC SURGERY

## 2021-01-26 PROCEDURE — 160046 HCHG PACU - 1ST 60 MINS PHASE II: Performed by: ORTHOPAEDIC SURGERY

## 2021-01-26 PROCEDURE — 160009 HCHG ANES TIME/MIN: Performed by: ORTHOPAEDIC SURGERY

## 2021-01-26 PROCEDURE — 700111 HCHG RX REV CODE 636 W/ 250 OVERRIDE (IP): Performed by: ANESTHESIOLOGY

## 2021-01-26 PROCEDURE — 160035 HCHG PACU - 1ST 60 MINS PHASE I: Performed by: ORTHOPAEDIC SURGERY

## 2021-01-26 PROCEDURE — 501838 HCHG SUTURE GENERAL: Performed by: ORTHOPAEDIC SURGERY

## 2021-01-26 PROCEDURE — 160025 RECOVERY II MINUTES (STATS): Performed by: ORTHOPAEDIC SURGERY

## 2021-01-26 PROCEDURE — 700102 HCHG RX REV CODE 250 W/ 637 OVERRIDE(OP): Performed by: ANESTHESIOLOGY

## 2021-01-26 DEVICE — IMPLANTABLE DEVICE: Type: IMPLANTABLE DEVICE | Site: WRIST | Status: FUNCTIONAL

## 2021-01-26 DEVICE — SCREW SN 3.5X16MM CRTX ST LCK - (2SFX10+2DRX8+1SFSC+10=46): Type: IMPLANTABLE DEVICE | Site: WRIST | Status: FUNCTIONAL

## 2021-01-26 DEVICE — SCREW SN 3.5X16MM CRTX ST LP - (2SFX10+2DRX8+1SFSCX10=46): Type: IMPLANTABLE DEVICE | Site: WRIST | Status: FUNCTIONAL

## 2021-01-26 DEVICE — SCREW HEX SELF TAPPING LOCKING STERILE 2.5X20MM (2DRX8=16): Type: IMPLANTABLE DEVICE | Site: WRIST | Status: FUNCTIONAL

## 2021-01-26 RX ORDER — LIDOCAINE HYDROCHLORIDE 20 MG/ML
INJECTION, SOLUTION EPIDURAL; INFILTRATION; INTRACAUDAL; PERINEURAL PRN
Status: DISCONTINUED | OUTPATIENT
Start: 2021-01-26 | End: 2021-01-26 | Stop reason: SURG

## 2021-01-26 RX ORDER — CEFAZOLIN SODIUM 1 G/3ML
INJECTION, POWDER, FOR SOLUTION INTRAMUSCULAR; INTRAVENOUS PRN
Status: DISCONTINUED | OUTPATIENT
Start: 2021-01-26 | End: 2021-01-26 | Stop reason: SURG

## 2021-01-26 RX ORDER — HYDROMORPHONE HYDROCHLORIDE 1 MG/ML
0.2 INJECTION, SOLUTION INTRAMUSCULAR; INTRAVENOUS; SUBCUTANEOUS
Status: DISCONTINUED | OUTPATIENT
Start: 2021-01-26 | End: 2021-01-26 | Stop reason: HOSPADM

## 2021-01-26 RX ORDER — SCOLOPAMINE TRANSDERMAL SYSTEM 1 MG/1
1 PATCH, EXTENDED RELEASE TRANSDERMAL ONCE
Status: DISCONTINUED | OUTPATIENT
Start: 2021-01-26 | End: 2021-01-26 | Stop reason: HOSPADM

## 2021-01-26 RX ORDER — SODIUM CHLORIDE, SODIUM LACTATE, POTASSIUM CHLORIDE, CALCIUM CHLORIDE 600; 310; 30; 20 MG/100ML; MG/100ML; MG/100ML; MG/100ML
INJECTION, SOLUTION INTRAVENOUS CONTINUOUS
Status: DISCONTINUED | OUTPATIENT
Start: 2021-01-26 | End: 2021-01-26 | Stop reason: HOSPADM

## 2021-01-26 RX ORDER — PHENYLEPHRINE HCL IN 0.9% NACL 0.5 MG/5ML
SYRINGE (ML) INTRAVENOUS PRN
Status: DISCONTINUED | OUTPATIENT
Start: 2021-01-26 | End: 2021-01-26 | Stop reason: SURG

## 2021-01-26 RX ORDER — DIPHENHYDRAMINE HYDROCHLORIDE 50 MG/ML
12.5 INJECTION INTRAMUSCULAR; INTRAVENOUS
Status: DISCONTINUED | OUTPATIENT
Start: 2021-01-26 | End: 2021-01-26 | Stop reason: HOSPADM

## 2021-01-26 RX ORDER — DEXAMETHASONE SODIUM PHOSPHATE 4 MG/ML
INJECTION, SOLUTION INTRA-ARTICULAR; INTRALESIONAL; INTRAMUSCULAR; INTRAVENOUS; SOFT TISSUE PRN
Status: DISCONTINUED | OUTPATIENT
Start: 2021-01-26 | End: 2021-01-26 | Stop reason: SURG

## 2021-01-26 RX ORDER — ACETAMINOPHEN 500 MG
1000 TABLET ORAL ONCE
Status: COMPLETED | OUTPATIENT
Start: 2021-01-26 | End: 2021-01-26

## 2021-01-26 RX ORDER — HYDRALAZINE HYDROCHLORIDE 20 MG/ML
5 INJECTION INTRAMUSCULAR; INTRAVENOUS
Status: DISCONTINUED | OUTPATIENT
Start: 2021-01-26 | End: 2021-01-26 | Stop reason: HOSPADM

## 2021-01-26 RX ORDER — HYDROCODONE BITARTRATE AND ACETAMINOPHEN 5; 325 MG/1; MG/1
1 TABLET ORAL EVERY 6 HOURS PRN
Status: ON HOLD | COMMUNITY
End: 2021-01-26

## 2021-01-26 RX ORDER — MIDAZOLAM HYDROCHLORIDE 1 MG/ML
INJECTION INTRAMUSCULAR; INTRAVENOUS PRN
Status: DISCONTINUED | OUTPATIENT
Start: 2021-01-26 | End: 2021-01-26 | Stop reason: SURG

## 2021-01-26 RX ORDER — BUPIVACAINE HYDROCHLORIDE 2.5 MG/ML
INJECTION, SOLUTION EPIDURAL; INFILTRATION; INTRACAUDAL PRN
Status: DISCONTINUED | OUTPATIENT
Start: 2021-01-26 | End: 2021-01-26 | Stop reason: SURG

## 2021-01-26 RX ORDER — ONDANSETRON 2 MG/ML
4 INJECTION INTRAMUSCULAR; INTRAVENOUS
Status: DISCONTINUED | OUTPATIENT
Start: 2021-01-26 | End: 2021-01-26 | Stop reason: HOSPADM

## 2021-01-26 RX ORDER — HYDROMORPHONE HYDROCHLORIDE 1 MG/ML
0.4 INJECTION, SOLUTION INTRAMUSCULAR; INTRAVENOUS; SUBCUTANEOUS
Status: DISCONTINUED | OUTPATIENT
Start: 2021-01-26 | End: 2021-01-26 | Stop reason: HOSPADM

## 2021-01-26 RX ORDER — METOPROLOL TARTRATE 1 MG/ML
1 INJECTION, SOLUTION INTRAVENOUS
Status: DISCONTINUED | OUTPATIENT
Start: 2021-01-26 | End: 2021-01-26 | Stop reason: HOSPADM

## 2021-01-26 RX ORDER — MEPERIDINE HYDROCHLORIDE 25 MG/ML
12.5 INJECTION INTRAMUSCULAR; INTRAVENOUS; SUBCUTANEOUS
Status: DISCONTINUED | OUTPATIENT
Start: 2021-01-26 | End: 2021-01-26 | Stop reason: HOSPADM

## 2021-01-26 RX ORDER — GABAPENTIN 300 MG/1
300 CAPSULE ORAL ONCE
Status: COMPLETED | OUTPATIENT
Start: 2021-01-26 | End: 2021-01-26

## 2021-01-26 RX ORDER — LABETALOL HYDROCHLORIDE 5 MG/ML
5 INJECTION, SOLUTION INTRAVENOUS
Status: DISCONTINUED | OUTPATIENT
Start: 2021-01-26 | End: 2021-01-26 | Stop reason: HOSPADM

## 2021-01-26 RX ORDER — OXYCODONE HCL 5 MG/5 ML
10 SOLUTION, ORAL ORAL
Status: DISCONTINUED | OUTPATIENT
Start: 2021-01-26 | End: 2021-01-26 | Stop reason: HOSPADM

## 2021-01-26 RX ORDER — ONDANSETRON 2 MG/ML
INJECTION INTRAMUSCULAR; INTRAVENOUS PRN
Status: DISCONTINUED | OUTPATIENT
Start: 2021-01-26 | End: 2021-01-26 | Stop reason: SURG

## 2021-01-26 RX ORDER — MIDAZOLAM HYDROCHLORIDE 1 MG/ML
1 INJECTION INTRAMUSCULAR; INTRAVENOUS
Status: DISCONTINUED | OUTPATIENT
Start: 2021-01-26 | End: 2021-01-26 | Stop reason: HOSPADM

## 2021-01-26 RX ORDER — HYDROMORPHONE HYDROCHLORIDE 1 MG/ML
0.1 INJECTION, SOLUTION INTRAMUSCULAR; INTRAVENOUS; SUBCUTANEOUS
Status: DISCONTINUED | OUTPATIENT
Start: 2021-01-26 | End: 2021-01-26 | Stop reason: HOSPADM

## 2021-01-26 RX ORDER — ACETAMINOPHEN 325 MG/1
650 TABLET ORAL EVERY 4 HOURS PRN
Status: ON HOLD | COMMUNITY
End: 2021-01-26

## 2021-01-26 RX ORDER — OXYCODONE HCL 5 MG/5 ML
5 SOLUTION, ORAL ORAL
Status: DISCONTINUED | OUTPATIENT
Start: 2021-01-26 | End: 2021-01-26 | Stop reason: HOSPADM

## 2021-01-26 RX ORDER — LIDOCAINE HYDROCHLORIDE 10 MG/ML
INJECTION, SOLUTION INFILTRATION; PERINEURAL
Status: DISCONTINUED
Start: 2021-01-26 | End: 2021-01-26 | Stop reason: HOSPADM

## 2021-01-26 RX ORDER — CELECOXIB 200 MG/1
400 CAPSULE ORAL ONCE
Status: COMPLETED | OUTPATIENT
Start: 2021-01-26 | End: 2021-01-26

## 2021-01-26 RX ORDER — BUPIVACAINE HYDROCHLORIDE 2.5 MG/ML
INJECTION, SOLUTION EPIDURAL; INFILTRATION; INTRACAUDAL
Status: COMPLETED
Start: 2021-01-26 | End: 2021-01-26

## 2021-01-26 RX ORDER — HALOPERIDOL 5 MG/ML
1 INJECTION INTRAMUSCULAR
Status: DISCONTINUED | OUTPATIENT
Start: 2021-01-26 | End: 2021-01-26 | Stop reason: HOSPADM

## 2021-01-26 RX ADMIN — SODIUM CHLORIDE, POTASSIUM CHLORIDE, SODIUM LACTATE AND CALCIUM CHLORIDE: 600; 310; 30; 20 INJECTION, SOLUTION INTRAVENOUS at 12:55

## 2021-01-26 RX ADMIN — BUPIVACAINE HYDROCHLORIDE 30 ML: 2.5 INJECTION, SOLUTION EPIDURAL; INFILTRATION; INTRACAUDAL; PERINEURAL at 13:44

## 2021-01-26 RX ADMIN — PROPOFOL 200 MG: 10 INJECTION, EMULSION INTRAVENOUS at 13:53

## 2021-01-26 RX ADMIN — MIDAZOLAM HYDROCHLORIDE 2 MG: 1 INJECTION, SOLUTION INTRAMUSCULAR; INTRAVENOUS at 13:37

## 2021-01-26 RX ADMIN — LIDOCAINE HYDROCHLORIDE 100 MG: 20 INJECTION, SOLUTION EPIDURAL; INFILTRATION; INTRACAUDAL; PERINEURAL at 13:53

## 2021-01-26 RX ADMIN — DEXAMETHASONE SODIUM PHOSPHATE 2 MG: 4 INJECTION, SOLUTION INTRAMUSCULAR; INTRAVENOUS at 13:44

## 2021-01-26 RX ADMIN — CELECOXIB 400 MG: 200 CAPSULE ORAL at 12:45

## 2021-01-26 RX ADMIN — EPHEDRINE SULFATE 10 MG: 50 INJECTION INTRAMUSCULAR; INTRAVENOUS; SUBCUTANEOUS at 14:25

## 2021-01-26 RX ADMIN — ONDANSETRON 4 MG: 2 INJECTION INTRAMUSCULAR; INTRAVENOUS at 14:35

## 2021-01-26 RX ADMIN — FENTANYL CITRATE 50 MCG: 50 INJECTION, SOLUTION INTRAMUSCULAR; INTRAVENOUS at 13:37

## 2021-01-26 RX ADMIN — Medication 100 MCG: at 14:18

## 2021-01-26 RX ADMIN — WATER 15 ML: 100 IRRIGANT IRRIGATION at 12:44

## 2021-01-26 RX ADMIN — Medication 200 MCG: at 14:22

## 2021-01-26 RX ADMIN — GABAPENTIN 300 MG: 300 CAPSULE ORAL at 12:44

## 2021-01-26 RX ADMIN — EPHEDRINE SULFATE 10 MG: 50 INJECTION INTRAMUSCULAR; INTRAVENOUS; SUBCUTANEOUS at 14:08

## 2021-01-26 RX ADMIN — DEXAMETHASONE SODIUM PHOSPHATE 4 MG: 4 INJECTION, SOLUTION INTRAMUSCULAR; INTRAVENOUS at 14:05

## 2021-01-26 RX ADMIN — FENTANYL CITRATE 50 MCG: 50 INJECTION, SOLUTION INTRAMUSCULAR; INTRAVENOUS at 14:36

## 2021-01-26 RX ADMIN — ACETAMINOPHEN 1000 MG: 500 TABLET, FILM COATED ORAL at 12:44

## 2021-01-26 RX ADMIN — CEFAZOLIN 2 G: 1 INJECTION, POWDER, FOR SOLUTION INTRAVENOUS at 13:53

## 2021-01-26 RX ADMIN — SCOLOPAMINE TRANSDERMAL SYSTEM 1 PATCH: 1 PATCH, EXTENDED RELEASE TRANSDERMAL at 12:58

## 2021-01-26 ASSESSMENT — PAIN SCALES - GENERAL: PAIN_LEVEL: 0

## 2021-01-26 ASSESSMENT — FIBROSIS 4 INDEX: FIB4 SCORE: 1.04

## 2021-01-26 NOTE — OR SURGEON
Immediate Post OP Note    PreOp Diagnosis: Rt distal radius fx    PostOp Diagnosis: Rt distal radius 2 part fx    Procedure(s):  ORIF, WRIST - DISTAL RADIUS - Wound Class: Clean    Surgeon(s):  Pasha Lindsay M.D.    Anesthesiologist/Type of Anesthesia:  Anesthesiologist: Brigido Casas M.D./General    Surgical Staff:  Circulator: Alexsandra Gates R.N.  Scrub Person: Tila Luna  Radiology Technologist: Dariusz Man    Specimens removed if any:  * No specimens in log *    Estimated Blood Loss: minimal    Findings: comminuted distal radius fx    Complications: none        1/26/2021 2:45 PM Pasha Lindsay M.D.

## 2021-01-26 NOTE — ANESTHESIA PROCEDURE NOTES
Peripheral Block    Date/Time: 1/26/2021 1:38 PM  Performed by: Brigido Casas M.D.  Authorized by: Brigido Casas M.D.     Patient Location:  Pre-op  Start Time:  1/26/2021 1:38 PM  End Time:  1/26/2021 1:44 PM  Reason for Block: at surgeon's request and post-op pain management ONLY    patient identified, IV checked, site marked, risks and benefits discussed, surgical consent, monitors and equipment checked, pre-op evaluation and timeout performed    Patient Position:  Supine  Prep: ChloraPrep    Monitoring:  Heart rate, continuous pulse ox and cardiac monitor  Block Region:  Upper Extremity  Upper Extremity - Block Type:  BRACHIAL PLEXUS block, axillary approach    Laterality:  Right  Procedures: ultrasound guided  Image captured, interpreted and electronically stored.  Local Infiltration:  Lidocaine  Strength:  1 %  Dose:  3 ml  Block Type:  Single-shot  Needle Length:  100mm  Needle Gauge:  21 G  Needle Localization:  Ultrasound guidance  Injection Assessment:  Negative aspiration for heme, no paresthesia on injection, incremental injection and local visualized surrounding nerve on ultrasound  Evidence of intravascular injection: No     US Guided Axillary Block   US probe placed in axilla at intersection of pec major and biceps muscles.  Axillary Artery (AA) identified with Median (superficial), Radial (deep) and Ulnar (caudad) nerves surrounding artery.  Needle inserted cephalad to probe in an in plane approach to a perivascular/perineural position.  After negative aspiration LA injected with ease and visualized surrounding the artery and nerves.  Probe moved cephalad to identify corcobrachialis muscle and Musculocutaneous Nerve (MCN) within muscle belly. Needle inserted cephalad to probe in an in plane approach to a perineural position.  After negative aspiration LA injected with ease and visualized surrounding MCN.

## 2021-01-26 NOTE — ANESTHESIA PROCEDURE NOTES
Airway    Date/Time: 1/26/2021 1:54 PM  Performed by: Brigido Casas M.D.  Authorized by: Brigido Casas M.D.     Location:  OR  Urgency:  Elective  Indications for Airway Management:  Anesthesia      Spontaneous Ventilation: absent    Sedation Level:  Deep  Preoxygenated: Yes    Mask Difficulty Assessment:  1 - vent by mask  Final Airway Type:  Supraglottic airway  Final Supraglottic Airway:  Standard LMA    SGA Size:  5  Number of Attempts at Approach:  1

## 2021-01-26 NOTE — OR NURSING
Patient allergies and NPO status verified, home medication reconciliation completed and belongings secured. Patient verbalizes understanding of pain scale, expected course of stay and plan of care. Surgical site verified with patient. IV access established. Sequentials placed on legs. Triple aim completed by RN

## 2021-01-26 NOTE — DISCHARGE INSTRUCTIONS
ACTIVITY: Rest and take it easy for the first 24 hours.  A responsible adult is recommended to remain with you during that time.  It is normal to feel sleepy.  We encourage you to not do anything that requires balance, judgment or coordination.    MILD FLU-LIKE SYMPTOMS ARE NORMAL. YOU MAY EXPERIENCE GENERALIZED MUSCLE ACHES, THROAT IRRITATION, HEADACHE AND/OR SOME NAUSEA.    FOR 24 HOURS DO NOT:  Drive, operate machinery or run household appliances.  Drink beer or alcoholic beverages.   Make important decisions or sign legal documents.    SPECIAL INSTRUCTIONS: Non-weight bearing, Keep dressing clean, dry and intact until instructed otherwise by surgeon.    DIET: To avoid nausea, slowly advance diet as tolerated, avoiding spicy or greasy foods for the first day.  Add more substantial food to your diet according to your physician's instructions.   INCREASE FLUIDS AND FIBER TO AVOID CONSTIPATION.    SURGICAL DRESSING/BATHING: Keep dressing clean, dry and intact until instructed otherwise by surgeon.    FOLLOW-UP APPOINTMENT:  A follow-up appointment should be arranged with your doctor, call to schedule.    You should CALL YOUR PHYSICIAN if you develop:  Fever greater than 101 degrees F.  Pain not relieved by medication, or persistent nausea or vomiting.  Excessive bleeding (blood soaking through dressing) or unexpected drainage from the wound.  Extreme redness or swelling around the incision site, drainage of pus or foul smelling drainage.  Inability to urinate or empty your bladder within 8 hours.  Problems with breathing or chest pain.    You should call 911 if you develop problems with breathing or chest pain.  If you are unable to contact your doctor or surgical center, you should go to the nearest emergency room or urgent care center.  Physician's telephone #: [phone]    If any questions arise, call your doctor.  If your doctor is not available, please feel free to call the Surgical Center at  [phone]. The Contact Center is open Monday through Friday 7AM to 5PM and may speak to a nurse at [phone], or toll free at [phone].     A registered nurse may call you a few days after your surgery to see how you are doing after your procedure.    MEDICATIONS: Resume taking daily medication.  Take prescribed pain medication with food.  If no medication is prescribed, you may take non-aspirin pain medication if needed.  PAIN MEDICATION CAN BE VERY CONSTIPATING.  Take a stool softener or laxative such as senokot, pericolace, or milk of magnesia if needed.    Prescription given for N/A.  Last pain medication given at N/A.    If your physician has prescribed pain medication that includes Acetaminophen (Tylenol), do not take additional Acetaminophen (Tylenol) while taking the prescribed medication.    Depression / Suicide Risk    As you are discharged from this [facility] facility, it is important to learn how to keep safe from harming yourself.    Recognize the warning signs:  · Abrupt changes in personality, positive or negative- including increase in energy   · Giving away possessions  · Change in eating patterns- significant weight changes-  positive or negative  · Change in sleeping patterns- unable to sleep or sleeping all the time   · Unwillingness or inability to communicate  · Depression  · Unusual sadness, discouragement and loneliness  · Talk of wanting to die  · Neglect of personal appearance   · Rebelliousness- reckless behavior  · Withdrawal from people/activities they love  · Confusion- inability to concentrate     If you or a loved one observes any of these behaviors or has concerns about self-harm, here's what you can do:  · Talk about it- your feelings and reasons for harming yourself  · Remove any means that you might use to hurt yourself (examples: pills, rope, extension cords, firearm)  · Get professional help from the community (Mental Health, Substance Abuse, psychological  counseling)  · Do not be alone:Call your Safe Contact- someone whom you trust who will be there for you.  · Call your local CRISIS HOTLINE 096-5932 or 942-729-0380  · Call your local Children's Mobile Crisis Response Team [place] (054) 514-2380 or www.mySkin  · Call the toll free National Suicide Prevention Hotlines   · National Suicide Prevention Lifeline 623-839-CPNE (5597)  National Hope Line Network 800-SUICIDE (331-8604)    Peripheral Nerve Block Discharge Instructions from Same Day Surgery and Inpatient :      Precautions  · The numbness may affect your balance  · Be careful when walking or moving around  · Your leg may be weak: be very careful putting weight on it  · If your surgeon did not specify a time, you should not bear weight for 24 hours  · Be sure to ask for help when you need it  · It is better to have help than to fall and hurt yourself  What to Expect - Upper Extremity  · You may experience numbness and weakness in {ARM LOCATION PNB:727326}  on the same side as your surgery  · This is normal. For some people, this may be an unpleasant sensation. Be very careful with your numb limb  · Ask for help when you need it  Shoulder Surgery Side Effects  · In addition to numbness and weakness you may experience other symptoms  · Other nerves that are close to those nerves injected can also be affected by local anesthesia  · You may experience a hoarseness in your voice  · Your breathing may feel different  · You may also notice drooping of your eyelid, pupil constriction, and decreased sweating, on the side of your surgery  · All of these side effects are normal and will resolve when the local anesthetic wears off   Prevent Injury  · Protect the limb like a baby  · Beware of exposing your limb to extreme heat or cold or trauma  · The limb may be injured without you noticing because it is numb  · Keep the limb elevated whenever possible  · Do not sleep on the limb  · Change the position of  "the limb regularly  · Avoid putting pressure on your surgical limb  Pain Control  · The initial block on the day of surgery will make your extremity feel \"numb\"  · Any consecutive injection including prior to discharge from the hospital will make your extremity feel \"numb\"  · You may feel an aching or burning when the local anesthesia starts to wear off  · Take pain pills as prescribed by your surgeon  · Call your surgeon or anesthesiologist if you do not have adequate pain control  ·   "

## 2021-01-26 NOTE — ANESTHESIA PREPROCEDURE EVALUATION
Relevant Problems   ANESTHESIA   (+) PONV (postoperative nausea and vomiting)       Physical Exam    Airway   Mallampati: II  TM distance: >3 FB  Neck ROM: full       Cardiovascular - normal exam  Rhythm: regular  Rate: normal  (-) murmur     Dental - normal exam           Pulmonary - normal exam  Breath sounds clear to auscultation     Abdominal    Neurological - normal exam                 Anesthesia Plan    ASA 2       Plan - general and peripheral nerve block     Peripheral nerve block will be post-op pain control  Airway plan will be LMA  (Axillary nerve block)      Induction: intravenous    Postoperative Plan: Postoperative administration of opioids is intended.    Pertinent diagnostic labs and testing reviewed    Informed Consent:    Anesthetic plan and risks discussed with patient.    Use of blood products discussed with: patient whom consented to blood products.          -continue daily statin

## 2021-01-26 NOTE — ANESTHESIA TIME REPORT
Anesthesia Start and Stop Event Times     Date Time Event    1/26/2021 1321 Ready for Procedure     1348 Anesthesia Start     1447 Anesthesia Stop        Responsible Staff  01/26/21    Name Role Begin End    Brigido Casas M.D. Anesth 1348 1447        Preop Diagnosis (Free Text):  Pre-op Diagnosis     CLOSED FRACTURE OF DISTAL END OF RADIUS right wrist        Preop Diagnosis (Codes):    Post op Diagnosis  Closed fracture of right distal radius      Premium Reason  Non-Premium    Comments:

## 2021-01-26 NOTE — OR NURSING
Received report from PACU RN  He is awake. Denies pain and nausea at this time.  Wanda c/d/i.  Will give discharge instructions to pt and pt's wife.

## 2021-01-27 NOTE — ANESTHESIA POSTPROCEDURE EVALUATION
Patient: Bhargav Pozo    Procedure Summary     Date: 01/26/21 Room / Location:  OR 03 / SURGERY [facility]    Anesthesia Start: 1348 Anesthesia Stop: 1447    Procedure: ORIF, WRIST - DISTAL RADIUS (Right Wrist) Diagnosis: (CLOSED FRACTURE OF DISTAL END OF RADIUS right wrist)    Surgeons: Pasha Lindsay M.D. Responsible Provider: Brigido Casas M.D.    Anesthesia Type: general, peripheral nerve block ASA Status: 2          Final Anesthesia Type: general, peripheral nerve block  Last vitals  BP   Blood Pressure: 138/88    Temp   36.4 °C (97.5 °F)    Pulse   Pulse: 64   Resp   14    SpO2   99 %      Anesthesia Post Evaluation    Patient location during evaluation: PACU  Patient participation: complete - patient participated  Level of consciousness: awake and alert  Pain score: 0    Airway patency: patent  Anesthetic complications: no  Cardiovascular status: hemodynamically stable  Respiratory status: acceptable  Hydration status: euvolemic    PONV: none           Nurse Pain Score: 0 (NPRS)

## 2021-01-27 NOTE — OP REPORT
DATE OF SERVICE:  01/26/2021     PREOPERATIVE DIAGNOSIS:  Comminuted right distal radius fracture.     POSTOPERATIVE DIAGNOSIS:  Right distal radius two part fracture.     PROCEDURE PERFORMED:  reduction internal fixation right distal radius   fracture.     SURGEON:  Pasha Lindsay MD     ASSISTANT:  MARIKA Alonso.     ANESTHESIA:  General endotracheal anesthesia with interscalene block.     ANESTHESIOLOGIST:  Brigido Casas MD     ESTIMATED BLOOD LOSS:  Minimal.     COMPLICATIONS:  None.     INDICATIONS FOR PROCEDURE:  This is a 50-year-old male who was snowboarding   and sustained a distal radius fracture.  He had significant dorsal   comminution.  He is unstable.  He presents for operative treatment.  For   further details of H and P, please see chart.     Risks, benefits and alternatives of procedure were discussed with the patient   and include, but are not limited to bleeding, infection, neurovascular injury,   malunion, nonunion, PE, DVT, blood transfusion with its associated risks,   anesthesia with its associated risks, and death.  All questions were answered.    No warranties or guarantees were given or implied.  Consent is signed and is   on chart.     DESCRIPTION OF PROCEDURE:  The patient was taken to the operating room and   placed supine on the operating room table.  Prior to this, an interscalene   block was placed in preoperative holding for postoperative pain control.  The   patient's right upper extremity was prepped and draped in normal sterile   fashion.  Arm was exsanguinated and the tourniquet inflated to 250 mmHg.  A 6   cm incision was made over the volar radial aspect of the wrist.  This was   taken sharply through skin and subcutaneous tissue.  Bovie cautery was used to   obtain hemostasis.  Dissection was carried to the level of the flexor carpi   radialis.  The volar tendon sheath was split.  Tendon was taken radially.  The   dorsal sheath was split.  Blunt dissection  was carried down to the level of   the pronator.  Pronator was significantly damaged from the fracture.  The   remainder of it was removed at the radial aspect of the distal radius.  This   was bluntly elevated.  The fracture site was identified.  There was some   radial deviation as well as dorsal tilt.  Using an elevator, the dorsal tilt   was reduced.  A [employer] wide distal radius plate was chosen.  This   was placed in appropriate position.  A single screw was placed through the   shaft in the gliding hole.  A pointed reduction clamp was then placed on the   distal radius to improve the radial deviation.  At this point, fluoroscopic   images showed excellent reduction with good position of the hardware.  A   single nonlocking screw was placed through the distal aspect of the plate.    This reduced the fracture quite well.  Four nonlocking screws were then placed   in the distal aspect of the plate.  The initial nonlocking screw was removed   and another locking screw was placed.  Images showed excellent reduction of   the fracture and good position of the hardware was extraarticular.  There was   good recreation of radial length as well as volar tilt.  The distal guide was   removed.  Two locking screws were placed through the shaft of the plate.    Final images demonstrated excellent alignment of the fracture with good   position of the hardware.  The wound was thoroughly lavaged.  Due to the   damage of the pronator, it was not able to be repaired.  Subcutaneous tissue   was closed using 2-0 Vicryl.  The skin was closed using 4-0 nylon.  The   patient was placed in a soft sterile dressing and a well-padded sugar tong   splint.  He was awakened from anesthesia and returned to recovery cart in the   recovery room in stable condition.  There was no silke or intraoperative   complications noted.        ______________________________  MD RISSA Hayden/MARILIN/BRIA    DD:  01/26/2021 14:49  DT:   01/26/2021 16:07    Job#:  [id number]

## 2021-11-10 ENCOUNTER — NON-PROVIDER VISIT (OUTPATIENT)
Dept: MEDICAL GROUP | Facility: OTHER | Age: 51
End: 2021-11-10
Payer: COMMERCIAL

## 2021-11-10 DIAGNOSIS — Z23 ENCOUNTER FOR VACCINATION: ICD-10-CM

## 2021-11-10 PROCEDURE — 90471 IMMUNIZATION ADMIN: CPT | Performed by: FAMILY MEDICINE

## 2021-11-10 PROCEDURE — 90715 TDAP VACCINE 7 YRS/> IM: CPT | Performed by: FAMILY MEDICINE

## 2021-11-10 NOTE — PROGRESS NOTES
"Bhargav Pozo is a 51 y.o. male here for a non-provider visit for: TDAP    Reason for immunization: Overdue/Provider Recommended  Immunization records indicate need for vaccine: Yes, confirmed with [place] [employer]  Minimum interval has been met for this vaccine: Yes  ABN completed: Yes    VIS Dated  08/06/2021 was given to patient: Yes  All IAC Questionnaire questions were answered \"No.\"    Patient tolerated injection and no adverse effects were observed or reported: Yes    Pt scheduled for next dose in series: Not Indicated  "

## 2021-11-12 ENCOUNTER — TELEPHONE (OUTPATIENT)
Dept: SPORTS MEDICINE | Facility: OTHER | Age: 51
End: 2021-11-12

## 2021-11-12 DIAGNOSIS — H92.02 EAR PAIN, LEFT: ICD-10-CM

## 2021-11-13 NOTE — TELEPHONE ENCOUNTER
Left ear pain on and off throughout the week.  On exam ear is normal no evidence of otitis media or otitis externa.  No TMJ pain no lymphadenopathy.  No systemic symptoms no eustachian tube dysfunction as can pop ears will refer to ENT

## 2022-01-14 ENCOUNTER — TELEPHONE (OUTPATIENT)
Dept: MEDICAL GROUP | Facility: OTHER | Age: 52
End: 2022-01-14

## 2022-01-14 RX ORDER — TAMSULOSIN HYDROCHLORIDE 0.4 MG/1
0.4 CAPSULE ORAL DAILY
Qty: 90 CAPSULE | Refills: 2 | Status: SHIPPED | OUTPATIENT
Start: 2022-01-14 | End: 2022-04-14

## 2022-01-14 RX ORDER — ATORVASTATIN CALCIUM 10 MG/1
10 TABLET, FILM COATED ORAL DAILY
Qty: 90 TABLET | Refills: 2 | Status: SHIPPED | OUTPATIENT
Start: 2022-01-14 | End: 2023-06-12

## 2022-01-24 ENCOUNTER — TELEPHONE (OUTPATIENT)
Dept: SPORTS MEDICINE | Facility: OTHER | Age: 52
End: 2022-01-24

## 2022-01-24 DIAGNOSIS — Z20.822 EXPOSURE TO COVID-19 VIRUS: ICD-10-CM

## 2022-01-25 ENCOUNTER — HOSPITAL ENCOUNTER (OUTPATIENT)
Facility: MEDICAL CENTER | Age: 52
End: 2022-01-25
Attending: FAMILY MEDICINE
Payer: COMMERCIAL

## 2022-01-25 PROCEDURE — U0003 INFECTIOUS AGENT DETECTION BY NUCLEIC ACID (DNA OR RNA); SEVERE ACUTE RESPIRATORY SYNDROME CORONAVIRUS 2 (SARS-COV-2) (CORONAVIRUS DISEASE [COVID-19]), AMPLIFIED PROBE TECHNIQUE, MAKING USE OF HIGH THROUGHPUT TECHNOLOGIES AS DESCRIBED BY CMS-2020-01-R: HCPCS

## 2022-01-25 PROCEDURE — U0005 INFEC AGEN DETEC AMPLI PROBE: HCPCS

## 2022-01-26 ENCOUNTER — TELEPHONE (OUTPATIENT)
Dept: MEDICAL GROUP | Facility: OTHER | Age: 52
End: 2022-01-26

## 2022-01-26 DIAGNOSIS — Z11.52 ENCOUNTER FOR SCREENING FOR COVID-19: ICD-10-CM

## 2022-01-26 NOTE — TELEPHONE ENCOUNTER
Patient needs Covid screening for cruise.  Ordered by Dr. Lange but wrong test was ordered.  Will order correct test test.

## 2022-03-25 ENCOUNTER — TELEPHONE (OUTPATIENT)
Dept: MEDICAL GROUP | Facility: CLINIC | Age: 52
End: 2022-03-25
Payer: COMMERCIAL

## 2022-03-25 DIAGNOSIS — N40.1 BENIGN PROSTATIC HYPERPLASIA WITH NOCTURIA: ICD-10-CM

## 2022-03-25 DIAGNOSIS — R35.1 BENIGN PROSTATIC HYPERPLASIA WITH NOCTURIA: ICD-10-CM

## 2022-03-25 RX ORDER — TADALAFIL 5 MG/1
5 TABLET ORAL DAILY
Qty: 30 TABLET | Refills: 1 | Status: SHIPPED | OUTPATIENT
Start: 2022-03-25 | End: 2022-05-24

## 2022-04-26 ENCOUNTER — TELEPHONE (OUTPATIENT)
Dept: MEDICAL GROUP | Facility: OTHER | Age: 52
End: 2022-04-26

## 2022-04-26 RX ORDER — HYDROCORTISONE ACETATE 25 MG/1
25 SUPPOSITORY RECTAL EVERY 12 HOURS
Qty: 20 SUPPOSITORY | Refills: 0 | Status: SHIPPED | OUTPATIENT
Start: 2022-04-26 | End: 2022-05-06

## 2022-08-23 ENCOUNTER — TELEPHONE (OUTPATIENT)
Dept: HOSPITALIST | Facility: MEDICAL CENTER | Age: 52
End: 2022-08-23

## 2022-08-23 DIAGNOSIS — M25.561 CHRONIC PAIN OF RIGHT KNEE: ICD-10-CM

## 2022-08-23 DIAGNOSIS — G89.29 CHRONIC PAIN OF RIGHT KNEE: ICD-10-CM

## 2022-09-15 ENCOUNTER — TELEPHONE (OUTPATIENT)
Dept: MEDICAL GROUP | Facility: OTHER | Age: 52
End: 2022-09-15

## 2022-09-15 DIAGNOSIS — S83.231A COMPLEX TEAR OF MEDIAL MENISCUS OF RIGHT KNEE AS CURRENT INJURY, INITIAL ENCOUNTER: ICD-10-CM

## 2022-09-21 RX ORDER — SCOLOPAMINE TRANSDERMAL SYSTEM 1 MG/1
1 PATCH, EXTENDED RELEASE TRANSDERMAL
Qty: 6 PATCH | Refills: 3 | Status: SHIPPED | OUTPATIENT
Start: 2022-09-21

## 2022-12-09 NOTE — TELEPHONE ENCOUNTER
[address] [facility] [address]  [address] [place] [id number]  Dept: [phone]  Dept Fax: [phone]  Loc: [phone]    Visit Date: 2022    J Luis Cornejo is a 74 y.o. female who presents todayfor:  Chief Complaint   Patient presents with    1 Year Follow Up    Coronary Artery Disease    Check-Up    Atrial Fibrillation    Hypertension   Know A fib and PACER  No chest pain   No changes in breathing  B is stable   No dizziness  No syncope  No bleeding         HPI:  HPI  Past Medical History:   Diagnosis Date    Aortic aneurysm (HCC)     4.5 cm aorta    Arthritis     Atrial fibrillation, chronic ([address] 2019    CAD (coronary artery disease)     CHF NYHA class II, unspecified failure chronicity, unspecified type ([address] 10/8/2018    Colon polyps 3/01    Diabetic peripheral neuropathy ([address]       feet    Elbow fracture     Fluid overload 2021    Hyperlipidemia     Hypertension     Hypothyroidism     Pulmonary nodule, right 10/2017      repeat  ct of  chest      S/P cardiac pacemaker procedure: 10/21/2017: Medtronic Dual Chamber. 10/21/2017    10/21/2017: Medtronic Dual Chamber.  Dr. Lizet Morales    Systolic CHF, acute on chronic ([address] 6/10/2021    Type II or unspecified type diabetes mellitus without mention of complication, not stated as uncontrolled       Past Surgical History:   Procedure Laterality Date    BREAST BIOPSY Right 2016     benign   [place]    BREAST SURGERY      CARDIOVERSION  2019    atrial fib to sinus  [place]     SECTION  over 30 years ago     COLONOSCOPY  2012    colon polyps   [place]    CORONARY ANGIOPLASTY      [place]    CORONARY ANGIOPLASTY WITH STENT PLACEMENT      [facility]    CORONARY ANGIOPLASTY WITH STENT PLACEMENT  10/2018     lad branch    HEEL SPUR SURGERY Bilateral     bilat with hardware    HYSTERECTOMY (CERVIX STATUS UNKNOWN)      KNEE ARTHROSCOPY  ; 3/02    right and left knee    KNEE Pt in need of refill    ARTHROSCOPY Bilateral     bilat    PACEMAKER PLACEMENT  10/2017    THYROIDECTOMY, PARTIAL  1980's     Family History   Problem Relation Age of Onset    Heart Disease Mother 80        bacterial infection at valve    Heart Disease Father     Heart Attack Father     Heart Disease Brother         CABG     Social History     Tobacco Use    Smoking status: Never    Smokeless tobacco: Never   Substance Use Topics    Alcohol use: No      Current Outpatient Medications   Medication Sig Dispense Refill    clopidogrel (PLAVIX) 75 MG tablet Take 1 tablet by mouth once daily 90 tablet 0    atorvastatin (LIPITOR) 40 MG tablet Take 1 tablet by mouth once daily 90 tablet 0    Omega-3 Fatty Acids (FISH OIL) 1000 MG capsule Take by mouth daily      levothyroxine (SYNTHROID) 137 MCG tablet Take 1 tablet by mouth daily 90 tablet 1    glimepiride (AMARYL) 4 MG tablet Take 1 tablet by mouth twice daily 180 tablet 0    ELIQUIS 5 MG TABS tablet Take 1 tablet by mouth twice daily 180 tablet 0    metOLazone (ZAROXOLYN) 5 MG tablet Take 1 tablet by mouth as needed (worsening swelling) As  Needed 10 tablet 0    bumetanide (BUMEX) 1 MG tablet Take 2 tablets by mouth 2 times daily TAKE 2 MG IN THE MORNING, 1 MG IN THE AFTERNOON (Patient taking differently: TAKE 2 MG IN THE MORNING, 1 MG IN THE AFTERNOON) 360 tablet 1    amLODIPine (NORVASC) 5 MG tablet Take 1 tablet by mouth daily 90 tablet 1    potassium chloride (KLOR-CON) 10 MEQ extended release tablet Take 1 tablet by mouth once daily with breakfast 90 tablet 0    gabapentin (NEURONTIN) 300 MG capsule TAKE 1 CAPSULE BY MOUTH ONCE DAILY IN THE EVENING 90 capsule 1    amiodarone (CORDARONE) 200 MG tablet Take 1 tablet by mouth once daily 90 tablet 1    metoprolol tartrate (LOPRESSOR) 50 MG tablet Take 1 tablet by mouth twice daily 512 tablet 1    TRULICITY 7.60 AS/0.3LO SOPN INJECT 1  SUBCUTANEOUSLY ONCE A WEEK 4 mL 0    nitroGLYCERIN (NITROSTAT) 0.4 MG SL tablet Place 1 tablet under the tongue every 5 minutes as needed for Chest pain up to max of 3 total doses. If no relief after 1 dose, call 911. 25 tablet 1    Glucose Blood (JOSE E BREEZE 2 TEST) DISK USE ONE STRIP TWICE DAILY, DX: E11.9 200 each 5    Ascorbic Acid (VITAMIN C) 500 MG tablet Take 500 mg by mouth 2 times daily        No current facility-administered medications for this visit. Allergies   Allergen Reactions    Adhesive Tape Rash     Health Maintenance   Topic Date Due    Shingles vaccine (1 of 2) Never done    DEXA (modify frequency per FRAX score)  Never done    Diabetic retinal exam  03/12/2016    Diabetic foot exam  09/30/2016    COVID-19 Vaccine (4 - Booster for Pfizer series) 01/15/2022    Colorectal Cancer Screen  02/07/2023    Lipids  11/18/2023    A1C test (Diabetic or Prediabetic)  11/22/2023    Depression Screen  11/22/2023    DTaP/Tdap/Td vaccine (2 - Td or Tdap) 04/28/2026    Flu vaccine  Completed    Pneumococcal 65+ years Vaccine  Completed    Hepatitis C screen  Completed    Hepatitis A vaccine  Aged Out    Hib vaccine  Aged Out    Meningococcal (ACWY) vaccine  Aged Out       Subjective:  Review of Systems  General:   No fever, no chills, No fatigue or weight loss  Pulmonary:    Some baseline  dyspnea, no wheezing  Cardiac:    Denies recent chest pain,   GI:     No nausea or vomiting, no abdominal pain  Neuro:    No dizziness or light headedness,   Musculoskeletal:  No recent active issues  Extremities:   No edema, no obvious claudication     Objective:  Physical Exam  BP (!) 151/86   Pulse 80   Ht 5' 6\" (1.676 m)   Wt 243 lb 9.6 oz (110.5 kg)   BMI 39.32 kg/m²   General:   Well developed, well nourished  Lungs:   Clear to auscultation  Heart:    Normal S1 S2, Slight murmur. no rubs, no gallops  Abdomen:   Soft, non tender, no organomegalies, positive bowel sounds  Extremities:   No edema, no cyanosis, good peripheral pulses  Neurological:   Awake, alert, oriented.  No obvious focal deficits  Musculoskelatal: No obvious deformities    Assessment:      Diagnosis Orders   1. S/P cardiac pacemaker procedure  EKG 12 Lead      2. Essential hypertension  EKG 12 Lead      3. Coronary artery disease involving native coronary artery of native heart without angina pectoris  EKG 12 Lead      As above  Cardiac fair for now   ECG in office was done today. I reviewed the ECG. No acute findings    Plan:  No follow-ups on file. As above  Continue risk factor modification and medical management  Thank you for allowing me to participate in the care of your patient. Please don't hesitate to contact me regarding any further issues related to the patient care      Orders Placed:  Orders Placed This Encounter   Procedures    EKG 12 Lead     Order Specific Question:   Reason for Exam?     Answer: Other       Medications Prescribed:  No orders of the defined types were placed in this encounter. Discussed use, benefit, and side effects of prescribed medications. All patient questions answered. Pt voicedunderstanding. Instructed to continue current medications, diet and exercise. Continue risk factor modification and medical management. Patient agreed with treatment plan. Follow up as directed.     Electronically signedby Jamey Prince MD on 12/9/2022 at 11:39 AM

## 2023-04-07 ENCOUNTER — OFFICE VISIT (OUTPATIENT)
Dept: MEDICAL GROUP | Facility: CLINIC | Age: 53
End: 2023-04-07

## 2023-04-07 VITALS
DIASTOLIC BLOOD PRESSURE: 83 MMHG | TEMPERATURE: 98 F | HEART RATE: 81 BPM | HEIGHT: 70 IN | OXYGEN SATURATION: 97 % | WEIGHT: 187 LBS | RESPIRATION RATE: 16 BRPM | BODY MASS INDEX: 26.77 KG/M2 | SYSTOLIC BLOOD PRESSURE: 126 MMHG

## 2023-04-07 DIAGNOSIS — Z12.5 SCREENING FOR PROSTATE CANCER: ICD-10-CM

## 2023-04-07 DIAGNOSIS — Z00.00 PREVENTATIVE HEALTH CARE: ICD-10-CM

## 2023-04-07 DIAGNOSIS — J30.2 SEASONAL ALLERGIES: ICD-10-CM

## 2023-04-07 DIAGNOSIS — R05.1 ACUTE COUGH: ICD-10-CM

## 2023-04-07 DIAGNOSIS — M70.41 PREPATELLAR BURSITIS OF RIGHT KNEE: ICD-10-CM

## 2023-04-07 DIAGNOSIS — R79.89 LOW TESTOSTERONE: ICD-10-CM

## 2023-04-07 PROCEDURE — 99214 OFFICE O/P EST MOD 30 MIN: CPT | Mod: 25,GC | Performed by: STUDENT IN AN ORGANIZED HEALTH CARE EDUCATION/TRAINING PROGRAM

## 2023-04-07 PROCEDURE — 96372 THER/PROPH/DIAG INJ SC/IM: CPT | Performed by: STUDENT IN AN ORGANIZED HEALTH CARE EDUCATION/TRAINING PROGRAM

## 2023-04-07 RX ORDER — TRIAMCINOLONE ACETONIDE 40 MG/ML
40 INJECTION, SUSPENSION INTRA-ARTICULAR; INTRAMUSCULAR ONCE
Status: COMPLETED | OUTPATIENT
Start: 2023-04-07 | End: 2023-04-07

## 2023-04-07 RX ORDER — BENZONATATE 100 MG/1
100 CAPSULE ORAL 3 TIMES DAILY PRN
Qty: 60 CAPSULE | Refills: 0 | Status: SHIPPED | OUTPATIENT
Start: 2023-04-07

## 2023-04-07 RX ADMIN — TRIAMCINOLONE ACETONIDE 40 MG: 40 INJECTION, SUSPENSION INTRA-ARTICULAR; INTRAMUSCULAR at 17:02

## 2023-04-08 NOTE — PROGRESS NOTES
"Subjective:     CC: cough    HPI:   Bhargav Lewis presents today with chief concern of cough    He has seasonal allergies. Cough is main symptom. He does not like taking daily allergy medicine. He had this last year and when he got Kenalog shot the cough completely resolved. No acid reflux. No history of asthma. No concern for infection.     He has swelling at right knee. Had surgery in January for meniscus. Had effusion at first that he saw Ortho for.     Wanting annual labs done.     Problem   Prepatellar Bursitis of Right Knee   Seasonal Allergies   Acute Cough   Low Testosterone   Screening for Prostate Cancer   Preventative Health Care       Current Outpatient Medications Ordered in Epic   Medication Sig Dispense Refill    benzonatate (TESSALON) 100 MG Cap Take 1 Capsule by mouth 3 times a day as needed for Cough. 60 Capsule 0    scopolamine (TRANSDERM-SCOP, 1.5 MG,) 1 mg/72hr PATCH 72 HR Place 1 Patch on the skin every 72 hours. 6 Patch 3    atorvastatin (LIPITOR) 10 MG Tab Take 1 Tablet by mouth every day. 90 Tablet 2     No current Epic-ordered facility-administered medications on file.       ROS:  Gen: no fevers, no chills  ENT: yes sore throat  Pulm: no sob, yes cough  CV: no chest pain, no palpitations  GI: no vomiting, no diarrhea  : no urinary changes  Skin: no rash      Objective:     Exam:  /83 (BP Location: Left arm, Patient Position: Sitting, BP Cuff Size: Adult)   Pulse 81   Temp 36.7 °C (98 °F) (Temporal)   Resp 16   Ht 1.778 m (5' 10\")   Wt 84.8 kg (187 lb)   SpO2 97%   BMI 26.83 kg/m²  Body mass index is 26.83 kg/m².    Gen: Alert and oriented, No apparent distress.  Neck: Full range of motion, no lymphadenopathy, oropharyngeal erythema  Lungs: Normal effort, no wheezing or rales  CV: Regular rate and rhythm. No murmurs  Ext: Moving all extremities, 2+ radial pulses bilaterally  MSK: right knee with some swelling over patella     Assessment & Plan:     52 y.o. male with the " following -     Problem List Items Addressed This Visit       Prepatellar bursitis of right knee    Seasonal allergies    Acute cough    Relevant Medications    benzonatate (TESSALON) 100 MG Cap    Other Relevant Orders    CBC WITH DIFFERENTIAL    TSH WITH REFLEX TO FT4    Low testosterone    Relevant Orders    TESTOSTERONE SERUM    Screening for prostate cancer    Relevant Orders    PROSTATE SPECIFIC AG SCREENING    Preventative health care    Relevant Orders    Comp Metabolic Panel    Lipid Profile       # prepatellar bursitis  - recommend good knee pads at work  - anticipate will improve  - follow-up if painful    # cough  # seasonal allergies  Has tried flonase. Does not like taking PO allergy medication.   - Kenalog 40 mg IM given today in clinic, patient accepting of associated risks  - trial of tessalon pearls    # screening  - check serum testosterone  - check PSA    # preventative  - check CBC, CMP, lipids, TSH

## 2023-04-21 ENCOUNTER — TELEPHONE (OUTPATIENT)
Dept: MEDICAL GROUP | Facility: CLINIC | Age: 53
End: 2023-04-21

## 2023-04-21 RX ORDER — PREDNISONE 50 MG/1
50 TABLET ORAL DAILY
Qty: 5 TABLET | Refills: 0 | Status: SHIPPED | OUTPATIENT
Start: 2023-04-21 | End: 2023-04-26

## 2023-04-21 RX ORDER — AZITHROMYCIN 250 MG/1
TABLET, FILM COATED ORAL
Qty: 6 TABLET | Refills: 0 | Status: SHIPPED
Start: 2023-04-21 | End: 2023-06-12

## 2023-04-21 NOTE — TELEPHONE ENCOUNTER
Bhargav called and is still coughing... now 4 - 5 weeks after uri     Will go ahead and write for a zpack and some prednisone     Fu in 2 weeks if not improved

## 2023-06-06 ENCOUNTER — TELEPHONE (OUTPATIENT)
Dept: MEDICAL GROUP | Facility: OTHER | Age: 53
End: 2023-06-06

## 2023-06-06 DIAGNOSIS — N40.1 BPH ASSOCIATED WITH NOCTURIA: ICD-10-CM

## 2023-06-06 DIAGNOSIS — R35.1 BPH ASSOCIATED WITH NOCTURIA: ICD-10-CM

## 2023-06-06 DIAGNOSIS — N40.0 BENIGN PROSTATIC HYPERPLASIA WITHOUT LOWER URINARY TRACT SYMPTOMS: ICD-10-CM

## 2023-06-06 RX ORDER — TAMSULOSIN HYDROCHLORIDE 0.4 MG/1
0.4 CAPSULE ORAL
Qty: 100 CAPSULE | Refills: 3 | Status: SHIPPED
Start: 2023-06-06 | End: 2023-06-13

## 2023-06-06 NOTE — TELEPHONE ENCOUNTER
Dysuria  Wants appt/needs ua  Still with cough despite kenalog shot & tessalon - maybe reflux.   Out of flomax. Not listed as med but Laura says [facility] so will refill it.

## 2023-06-09 ENCOUNTER — APPOINTMENT (OUTPATIENT)
Dept: MEDICAL GROUP | Facility: CLINIC | Age: 53
End: 2023-06-09

## 2023-06-12 ENCOUNTER — OFFICE VISIT (OUTPATIENT)
Dept: MEDICAL GROUP | Facility: CLINIC | Age: 53
End: 2023-06-12

## 2023-06-12 VITALS — WEIGHT: 176.3 LBS | BODY MASS INDEX: 25.24 KG/M2 | HEIGHT: 70 IN

## 2023-06-12 DIAGNOSIS — R05.2 SUBACUTE COUGH: ICD-10-CM

## 2023-06-12 DIAGNOSIS — N40.1 BENIGN PROSTATIC HYPERPLASIA WITH URINARY RETENTION: ICD-10-CM

## 2023-06-12 DIAGNOSIS — R30.0 DYSURIA: ICD-10-CM

## 2023-06-12 DIAGNOSIS — R33.8 BENIGN PROSTATIC HYPERPLASIA WITH URINARY RETENTION: ICD-10-CM

## 2023-06-12 LAB
APPEARANCE UR: CLEAR
BILIRUB UR STRIP-MCNC: NORMAL MG/DL
COLOR UR AUTO: YELLOW
GLUCOSE UR STRIP.AUTO-MCNC: NORMAL MG/DL
KETONES UR STRIP.AUTO-MCNC: NORMAL MG/DL
LEUKOCYTE ESTERASE UR QL STRIP.AUTO: NORMAL
NITRITE UR QL STRIP.AUTO: NORMAL
PH UR STRIP.AUTO: 5.5 [PH] (ref 5–8)
PROT UR QL STRIP: NORMAL MG/DL
RBC UR QL AUTO: NORMAL
SP GR UR STRIP.AUTO: >=1.03
UROBILINOGEN UR STRIP-MCNC: 0.2 MG/DL

## 2023-06-12 PROCEDURE — 99213 OFFICE O/P EST LOW 20 MIN: CPT | Mod: GE | Performed by: STUDENT IN AN ORGANIZED HEALTH CARE EDUCATION/TRAINING PROGRAM

## 2023-06-12 PROCEDURE — 81002 URINALYSIS NONAUTO W/O SCOPE: CPT | Performed by: STUDENT IN AN ORGANIZED HEALTH CARE EDUCATION/TRAINING PROGRAM

## 2023-06-12 RX ORDER — BUDESONIDE AND FORMOTEROL FUMARATE DIHYDRATE 80; 4.5 UG/1; UG/1
2 AEROSOL RESPIRATORY (INHALATION) 2 TIMES DAILY
Qty: 2 EACH | Refills: 0 | Status: SHIPPED | OUTPATIENT
Start: 2023-06-12 | End: 2023-08-10

## 2023-06-12 RX ORDER — FINASTERIDE 5 MG/1
5 TABLET, FILM COATED ORAL DAILY
Qty: 30 TABLET | Refills: 0 | Status: SHIPPED | OUTPATIENT
Start: 2023-06-12 | End: 2023-07-12

## 2023-06-12 NOTE — PROGRESS NOTES
Subjective:     CC: Medication side effects, cough    HPI:   Bhargav Lewis presents today with     Problem   Benign Prostatic Hyperplasia With Urinary Retention    The patient has history of BPH with urinary retention.  The patient was provided banding by his urologist, however there was no significant relief in his symptoms.  His urologist recommended additional banding, however the patient has declined additional banding as he does not want to be financially responsible for the same procedure.  Instead, the patient has elected to take Flomax as needed for urinary retention.  He takes it as needed as he does experience ejaculation problems with Flomax.  He denies any dysuria when he is taking his Flomax.  The dysuria coincides with urinary retention.     Subacute Cough    The patient reports a 4-month history of cough that is worse at night.  He reports he does have history of seasonal allergies that appear to be worsening every year.  The patient is unable to sleep because of this cough.  Additionally, it impedes in his ability to do his work as he cannot communicate effectively as he is often coughing between words.  He also notices coughing while lying down attempting to watch TV.  He does experience chest pain while coughing.  The patient reported that he has had minimal relief with Tessalon Perles.  He does not associate any particular foods to increase coughing.  He said he does have a history of acid reflux while eating red sauce.  The cough is not similar to the acid reflux symptoms that he experienced previously.  Patient's brother and daughter both have a history of asthma.  He declines being diagnosed with asthma himself.  He denies recent fever, chills, and night sweats.  Last year, he had relief in his coughing with the Kenalog shot.  This year, he has not had any relief in his cough with the Kenalog shot.         Current Outpatient Medications Ordered in Epic   Medication Sig Dispense Refill     "budesonide-formoterol (SYMBICORT) 80-4.5 MCG/ACT Aerosol Inhale 2 Puffs 2 times a day for 30 days. 2 Each 0    finasteride (PROSCAR) 5 MG Tab Take 1 Tablet by mouth every day for 30 days. 30 Tablet 0    tamsulosin (FLOMAX) 0.4 MG capsule Take 1 Capsule by mouth 1/2 hour after breakfast. 100 Capsule 3    benzonatate (TESSALON) 100 MG Cap Take 1 Capsule by mouth 3 times a day as needed for Cough. 60 Capsule 0    scopolamine (TRANSDERM-SCOP, 1.5 MG,) 1 mg/72hr PATCH 72 HR Place 1 Patch on the skin every 72 hours. 6 Patch 3     No current Epic-ordered facility-administered medications on file.     ROS negative unless stated in HPI.    Objective:     Exam:  BP (P) 122/86 (BP Location: Right arm, Patient Position: Sitting, BP Cuff Size: Adult)   Pulse (P) 67   Temp (P) 36.6 °C (97.8 °F) (Temporal)   Ht 1.778 m (5' 10\")   Wt 80 kg (176 lb 4.8 oz)   SpO2 (P) 96%   BMI 25.30 kg/m²  Body mass index is 25.3 kg/m².    Physical Exam  Constitutional:       Appearance: Normal appearance. He is normal weight.   Cardiovascular:      Rate and Rhythm: Normal rate and regular rhythm.      Heart sounds: Normal heart sounds.   Pulmonary:      Effort: Pulmonary effort is normal. No respiratory distress.      Breath sounds: Normal breath sounds. No wheezing.   Abdominal:      General: Bowel sounds are normal.      Palpations: Abdomen is soft.   Skin:     General: Skin is warm and dry.   Neurological:      Mental Status: He is alert.       Labs:   Results for orders placed or performed in visit on 06/12/23   POCT Urinalysis   Result Value Ref Range    POC Color Yellow Negative    POC Appearance Clear Negative    POC Glucose Neg Negative mg/dL    POC Bilirubin Neg Negative mg/dL    POC Ketones Neg Negative mg/dL    POC Specific Gravity >=1.030 <1.005 - >1.030    POC Blood Neg Negative    POC Urine PH 5.5 5.0 - 8.0    POC Protein Neg Negative mg/dL    POC Urobiligen 0.2 Negative (0.2) mg/dL    POC Nitrites Neg Negative    POC Leukocyte " Esterase Neg Negative       Assessment & Plan:     52 y.o. male with the following -     Benign prostatic hyperplasia with urinary retention  -Urinalysis completed negative.  Urine culture not indicated.  -The patient is elected to transition from Flomax to finasteride to assess whether or not the patient would have decreased side effect profile with finasteride.  The patient will be prescribed a 30-day supply of finasteride.  The patient is asked to return in approximately 30 days to assess whether or not he has appreciated a decrease in effect profile with the medication change.  He expressed understanding.    Subacute cough  May be secondary to bronchoconstriction versus allergies versus asthma versus acid reflux  - Patient encouraged to continue take Tessalon Perles as a provide symptomatic relief.  - Patient provided a order for a chest x-ray.  Yun Yun message sent to him today.  - Patient provided a trial of Symbicort to assess whether or not bronchoconstriction may be contributing to his symptoms.  Patient is encouraged to return in approximately 1 month to assess whether or not he is experiencing improvement in his cough with Symbicort.  - Patient provided an order for pulmonary function test to assess whether or not obstructive versus restrictive lung disease may be contributing to his symptoms.       Return in about 4 weeks (around 7/10/2023), or Follow-up Symbicort, finasteride, chest x-ray.

## 2023-06-12 NOTE — ASSESSMENT & PLAN NOTE
-Urinalysis completed negative.  Urine culture not indicated.  -The patient is elected to transition from Flomax to finasteride to assess whether or not the patient would have decreased side effect profile with finasteride.  The patient will be prescribed a 30-day supply of finasteride.  The patient is asked to return in approximately 30 days to assess whether or not he has appreciated a decrease in effect profile with the medication change.  He expressed understanding.

## 2023-06-13 NOTE — ASSESSMENT & PLAN NOTE
May be secondary to bronchoconstriction versus allergies versus asthma versus acid reflux  - Patient encouraged to continue take Tessalon Perles as a provide symptomatic relief.  - Patient provided a order for a chest x-ray.  [employer] message sent to him today.  - Patient provided a trial of Symbicort to assess whether or not bronchoconstriction may be contributing to his symptoms.  Patient is encouraged to return in approximately 1 month to assess whether or not he is experiencing improvement in his cough with Symbicort.  - Patient provided an order for pulmonary function test to assess whether or not obstructive versus restrictive lung disease may be contributing to his symptoms.

## 2023-06-23 ENCOUNTER — TELEPHONE (OUTPATIENT)
Dept: MEDICAL GROUP | Facility: CLINIC | Age: 53
End: 2023-06-23

## 2023-06-23 NOTE — TELEPHONE ENCOUNTER
----- Message from Vee Gaines M.D. sent at 6/13/2023  2:22 AM PDT -----  Regarding: chest xray  Please call the patient and ask if he would like his chest xray order printed and mailed or if he would prefer to pick it up himself.     Thanks,    Vee Gaines M.D.

## 2023-08-10 DIAGNOSIS — R05.2 SUBACUTE COUGH: ICD-10-CM

## 2023-08-10 RX ORDER — DILTIAZEM HYDROCHLORIDE 60 MG/1
2 TABLET, FILM COATED ORAL 2 TIMES DAILY
Qty: 20.4 G | Refills: 2 | Status: SHIPPED | OUTPATIENT
Start: 2023-08-10

## 2024-07-30 ENCOUNTER — TELEPHONE (OUTPATIENT)
Dept: MEDICAL GROUP | Facility: CLINIC | Age: 54
End: 2024-07-30
Payer: COMMERCIAL

## 2024-07-30 DIAGNOSIS — R33.8 BENIGN PROSTATIC HYPERPLASIA WITH URINARY RETENTION: ICD-10-CM

## 2024-07-30 DIAGNOSIS — N40.1 BENIGN PROSTATIC HYPERPLASIA WITH URINARY RETENTION: ICD-10-CM

## 2024-07-30 DIAGNOSIS — Z00.00 PREVENTATIVE HEALTH CARE: ICD-10-CM

## 2024-07-30 DIAGNOSIS — E78.00 PURE HYPERCHOLESTEROLEMIA: ICD-10-CM

## 2024-07-30 DIAGNOSIS — R53.83 OTHER FATIGUE: ICD-10-CM

## 2024-07-30 DIAGNOSIS — R79.89 LOW TESTOSTERONE: ICD-10-CM

## 2024-07-30 DIAGNOSIS — Z12.5 SCREENING FOR PROSTATE CANCER: ICD-10-CM

## 2024-08-02 ENCOUNTER — HOSPITAL ENCOUNTER (OUTPATIENT)
Dept: LAB | Facility: MEDICAL CENTER | Age: 54
End: 2024-08-02
Attending: FAMILY MEDICINE
Payer: COMMERCIAL

## 2024-08-02 DIAGNOSIS — E78.00 PURE HYPERCHOLESTEROLEMIA: ICD-10-CM

## 2024-08-02 DIAGNOSIS — R79.89 LOW TESTOSTERONE: ICD-10-CM

## 2024-08-02 DIAGNOSIS — N40.1 BENIGN PROSTATIC HYPERPLASIA WITH URINARY RETENTION: ICD-10-CM

## 2024-08-02 DIAGNOSIS — R53.83 OTHER FATIGUE: ICD-10-CM

## 2024-08-02 DIAGNOSIS — Z00.00 PREVENTATIVE HEALTH CARE: ICD-10-CM

## 2024-08-02 DIAGNOSIS — Z12.5 SCREENING FOR PROSTATE CANCER: ICD-10-CM

## 2024-08-02 DIAGNOSIS — R33.8 BENIGN PROSTATIC HYPERPLASIA WITH URINARY RETENTION: ICD-10-CM

## 2024-08-02 LAB
25(OH)D3 SERPL-MCNC: 48 NG/ML (ref 30–100)
ALBUMIN SERPL BCP-MCNC: 3.8 G/DL (ref 3.2–4.9)
ALBUMIN/GLOB SERPL: 1.4 G/DL
ALP SERPL-CCNC: 59 U/L (ref 30–99)
ALT SERPL-CCNC: 32 U/L (ref 2–50)
ANION GAP SERPL CALC-SCNC: 12 MMOL/L (ref 7–16)
AST SERPL-CCNC: 25 U/L (ref 12–45)
BASOPHILS # BLD AUTO: 0 % (ref 0–1.8)
BASOPHILS # BLD: 0 K/UL (ref 0–0.12)
BILIRUB SERPL-MCNC: 0.7 MG/DL (ref 0.1–1.5)
BUN SERPL-MCNC: 14 MG/DL (ref 8–22)
CALCIUM ALBUM COR SERPL-MCNC: 9.3 MG/DL (ref 8.5–10.5)
CALCIUM SERPL-MCNC: 9.1 MG/DL (ref 8.5–10.5)
CHLORIDE SERPL-SCNC: 110 MMOL/L (ref 96–112)
CHOLEST SERPL-MCNC: 192 MG/DL (ref 100–199)
CO2 SERPL-SCNC: 21 MMOL/L (ref 20–33)
CREAT SERPL-MCNC: 0.93 MG/DL (ref 0.5–1.4)
EOSINOPHIL # BLD AUTO: 0.03 K/UL (ref 0–0.51)
EOSINOPHIL NFR BLD: 0.9 % (ref 0–6.9)
ERYTHROCYTE [DISTWIDTH] IN BLOOD BY AUTOMATED COUNT: 44.3 FL (ref 35.9–50)
EST. AVERAGE GLUCOSE BLD GHB EST-MCNC: 108 MG/DL
FASTING STATUS PATIENT QL REPORTED: NORMAL
FERRITIN SERPL-MCNC: 265 NG/ML (ref 22–322)
GFR SERPLBLD CREATININE-BSD FMLA CKD-EPI: 96 ML/MIN/1.73 M 2
GLOBULIN SER CALC-MCNC: 2.8 G/DL (ref 1.9–3.5)
GLUCOSE SERPL-MCNC: 89 MG/DL (ref 65–99)
HBA1C MFR BLD: 5.4 % (ref 4–5.6)
HCT VFR BLD AUTO: 42.8 % (ref 42–52)
HCV AB SER QL: NORMAL
HDLC SERPL-MCNC: 56 MG/DL
HGB BLD-MCNC: 14.1 G/DL (ref 14–18)
HIV 1+2 AB+HIV1 P24 AG SERPL QL IA: NORMAL
IMM GRANULOCYTES # BLD AUTO: 0.01 K/UL (ref 0–0.11)
IMM GRANULOCYTES NFR BLD AUTO: 0.3 % (ref 0–0.9)
LDLC SERPL CALC-MCNC: 122 MG/DL
LYMPHOCYTES # BLD AUTO: 1.55 K/UL (ref 1–4.8)
LYMPHOCYTES NFR BLD: 48.7 % (ref 22–41)
MCH RBC QN AUTO: 28.7 PG (ref 27–33)
MCHC RBC AUTO-ENTMCNC: 32.9 G/DL (ref 32.3–36.5)
MCV RBC AUTO: 87.2 FL (ref 81.4–97.8)
MONOCYTES # BLD AUTO: 0.43 K/UL (ref 0–0.85)
MONOCYTES NFR BLD AUTO: 13.5 % (ref 0–13.4)
NEUTROPHILS # BLD AUTO: 1.16 K/UL (ref 1.82–7.42)
NEUTROPHILS NFR BLD: 36.6 % (ref 44–72)
NRBC # BLD AUTO: 0 K/UL
NRBC BLD-RTO: 0 /100 WBC (ref 0–0.2)
PLATELET # BLD AUTO: 216 K/UL (ref 164–446)
PMV BLD AUTO: 11.1 FL (ref 9–12.9)
POTASSIUM SERPL-SCNC: 3.7 MMOL/L (ref 3.6–5.5)
PROT SERPL-MCNC: 6.6 G/DL (ref 6–8.2)
PSA SERPL-MCNC: 1.54 NG/ML (ref 0–4)
RBC # BLD AUTO: 4.91 M/UL (ref 4.7–6.1)
SODIUM SERPL-SCNC: 143 MMOL/L (ref 135–145)
TESTOST SERPL-MCNC: 563 NG/DL (ref 175–781)
TRIGL SERPL-MCNC: 72 MG/DL (ref 0–149)
TSH SERPL DL<=0.005 MIU/L-ACNC: 1.21 UIU/ML (ref 0.38–5.33)
WBC # BLD AUTO: 3.2 K/UL (ref 4.8–10.8)

## 2024-08-02 PROCEDURE — 80061 LIPID PANEL: CPT

## 2024-08-02 PROCEDURE — 80053 COMPREHEN METABOLIC PANEL: CPT

## 2024-08-02 PROCEDURE — 82306 VITAMIN D 25 HYDROXY: CPT

## 2024-08-02 PROCEDURE — 83036 HEMOGLOBIN GLYCOSYLATED A1C: CPT

## 2024-08-02 PROCEDURE — 84443 ASSAY THYROID STIM HORMONE: CPT

## 2024-08-02 PROCEDURE — 86803 HEPATITIS C AB TEST: CPT

## 2024-08-02 PROCEDURE — 87389 HIV-1 AG W/HIV-1&-2 AB AG IA: CPT

## 2024-08-02 PROCEDURE — 85025 COMPLETE CBC W/AUTO DIFF WBC: CPT

## 2024-08-02 PROCEDURE — 82728 ASSAY OF FERRITIN: CPT

## 2024-08-02 PROCEDURE — 84153 ASSAY OF PSA TOTAL: CPT

## 2024-08-02 PROCEDURE — 84403 ASSAY OF TOTAL TESTOSTERONE: CPT

## 2024-08-02 PROCEDURE — 36415 COLL VENOUS BLD VENIPUNCTURE: CPT

## 2024-08-03 NOTE — RESULT ENCOUNTER NOTE
Lipids elevated but ascvd is 5.9%. would repeat yearly.  Cbc normal but chronic low wbc at 3.2  Vit d normal - 48  Ferritin 265  HIV neg.  Hep C neg  PSA 1.54  TSH1.21  eGFR 96  CMP normal  Testosterone 563  HBA1C 5.4

## 2024-08-07 ENCOUNTER — OFFICE VISIT (OUTPATIENT)
Dept: MEDICAL GROUP | Facility: OTHER | Age: 54
End: 2024-08-07
Payer: COMMERCIAL

## 2024-08-07 VITALS
TEMPERATURE: 97 F | HEIGHT: 71 IN | BODY MASS INDEX: 25.91 KG/M2 | WEIGHT: 185.1 LBS | SYSTOLIC BLOOD PRESSURE: 130 MMHG | DIASTOLIC BLOOD PRESSURE: 78 MMHG | HEART RATE: 55 BPM | OXYGEN SATURATION: 97 % | RESPIRATION RATE: 16 BRPM

## 2024-08-07 DIAGNOSIS — Z23 NEED FOR VACCINATION: ICD-10-CM

## 2024-08-07 DIAGNOSIS — N40.1 BPH ASSOCIATED WITH NOCTURIA: ICD-10-CM

## 2024-08-07 DIAGNOSIS — Z00.00 PHYSICAL EXAM, ANNUAL: ICD-10-CM

## 2024-08-07 DIAGNOSIS — E78.5 HYPERLIPIDEMIA, UNSPECIFIED HYPERLIPIDEMIA TYPE: ICD-10-CM

## 2024-08-07 DIAGNOSIS — R35.1 BPH ASSOCIATED WITH NOCTURIA: ICD-10-CM

## 2024-08-07 RX ORDER — TAMSULOSIN HYDROCHLORIDE 0.4 MG/1
0.4 CAPSULE ORAL
COMMUNITY
End: 2024-08-21 | Stop reason: SDUPTHER

## 2024-08-07 ASSESSMENT — PATIENT HEALTH QUESTIONNAIRE - PHQ9: CLINICAL INTERPRETATION OF PHQ2 SCORE: 0

## 2024-08-07 ASSESSMENT — FIBROSIS 4 INDEX: FIB4 SCORE: 1.13

## 2024-08-07 NOTE — LETTER
[employer]  Ted Castillo M.D.  [address] [facility] [address]  [place] [address]  Fax: [phone]   Authorization for Release/Disclosure of   Protected Health Information   Name: BHARGAV POZO : 1970 SSN: [id number]   Address: [facility] [employer]  [facility] [address] Phone:    There are no phone numbers on file.   I authorize the entity listed below to release/disclose the PHI below to:   [facility]/Ted Castillo M.D. and Blake Agrawal D.O.   Provider or Entity Name:  {[facility] COLORECTAL SCREENING LOCATIONS:[id number]}   Reason for request: continuity of care   Information to be released:    [ X ] LAST COLONOSCOPY,  including any PATH REPORT and follow-up  [ X ] LAST FIT/COLOGUARD RESULT [  ] LAST DEXA  [  ] LAST MAMMOGRAM  [  ] LAST PAP  [  ] LAST LABS [  ] RETINA EXAM REPORT  [  ] IMMUNIZATION RECORDS  [  ] Release all info      [  ] Check here and initial the line next to each item to release ALL health information INCLUDING  _____ Care and treatment for drug and / or alcohol abuse  _____ HIV testing, infection status, or AIDS  _____ Genetic Testing    DATES OF SERVICE OR TIME PERIOD TO BE DISCLOSED: _____________  I understand and acknowledge that:  * This Authorization may be revoked at any time by you in writing, except if your health information has already been used or disclosed.  * Your health information that will be used or disclosed as a result of you signing this authorization could be re-disclosed by the recipient. If this occurs, your re-disclosed health information may no longer be protected by State or Federal laws.  * You may refuse to sign this Authorization. Your refusal will not affect your ability to obtain treatment.  * This Authorization becomes effective upon signing and will  on (date) __________.      If no date is indicated, this Authorization will  one (1) year from the signature date.    Name: Bhargav Pozo    Signature:   Date:      8/7/2024       PLEASE FAX REQUESTED RECORDS BACK TO: [phone]

## 2024-08-07 NOTE — PROGRESS NOTES
"SPORTS MEDICINE CLINIC VISIT  08/07/24    HPI:  Bhargav Pozo is a 53 y.o. male here for an annual physical  Chief Complaint   Patient presents with    Annual Exam     Pt is here for his annual physical    Referral Needed     He is also requesting a referral back to Urology       #Annual physical  Feeling well overall.  Colonoscopy completed approximately 2 years ago outside of our system.  Patient has completed ALISON so we may review these results.  He believes he has a 10-year follow-up.  PSA completed recently and was WNL.  Lipid panel mildly elevated cholesterol with ASCVD risk score below threshold for pharmacotherapy.  He does not smoke and has never smoked.  No chewing tobacco.  He denies any skin concerns.  Sleeping normally for him.  He follows with a dentist regularly and with an ophthalmologist yearly though he did miss his last appointment.      #Urinary concerns  Reports a history of UroLift with Dr. Gillis.  He has persistent nocturia and urinary flow problems.  He intermittently treats with Flomax but limits this due to dizziness symptoms.  He is also trialed finasteride and Viagra but did not find any benefit from the finasteride and finds chronic Viagra use to also induce dizziness.  He would like a reevaluation with urology.       #Fatigue  Previously had messaged Dr. Castillo with concern for fatigue symptoms.  He had a URI approximately 3 weeks ago and a coworker tested positive for COVID.  He assumed that he also had COVID at that time.    All fatigue symptoms have resolved.  Laboratory workup for fatigue was completed and overall unremarkable.          OBJECTIVE:  /78 (BP Location: Right arm, Patient Position: Sitting, BP Cuff Size: Adult)   Pulse (!) 55   Temp 36.1 °C (97 °F) (Temporal)   Resp 16   Ht 1.791 m (5' 10.5\")   Wt 84 kg (185 lb 1.6 oz)   SpO2 97%   BMI 26.18 kg/m²         Physical Exam:    GEN: Alert, pleasant, INAD  HEENT:  -Head: Normocephalic, Atraumatic  -Ears: " TM showing normal landmarks without erythema or effusion  -Eyes: Lids and conjunctiva without redness or swelling. Pupils equally round and reactive to light. Iris normal.  -Nose: mucosa is pink and moist. The nasal septum is midline. Nares are patent bilaterally.   -Throat: Clear without increased redness or drainage. Mucous membranes moist.  NECK:  Supple, no LAD/thyromegaly  CV:  RRR, no M/R/G  PUL: CTAB, no wheeze, rhonchi  ABD: Normal bowel sounds, nontender, no hepatosplenomegaly.  EXT: No gross abnormalities.  Upper extremity strength 5/5 NEURO:  Grossly intact.  PSYCH: No abnormal behaviors and does not appear to be distressed  SKIN: No rash      ASSESSMENT/PLAN:  1. Physical exam, annual  Preventative screenings are reportedly up-to-date.  ALISON for colonoscopy reports have been requested.  Receiving hepatitis B vaccine as below and will return for subsequent injections at the appropriate intervals.  He will report to his pharmacy for the first shingles vaccine.      2. Need for vaccination  - Hepatitis B Vaccine Adult 20+    3. BPH associated with nocturia  S/p UroLift with persistent nocturia and intermittent urinary retention symptoms that does respond to Flomax however the patient suffers from dizziness with chronic Flomax use.  Finasteride is not effective and daily sildenafil causes dizziness symptoms.  - Referral to urology Dr. Gillis for consideration of alternative treatment options    4. Hyperlipidemia, unspecified hyperlipidemia type  ASCVD risk score 5.9%.  - Repeat lipid panel yearly    Follow-up: For annual physical or sooner as needed for any acute complaints      Patient discussed with attending physician Dr. Jonathan Agrawal DO (Bo)   Primary Care Sports Medicine Fellow

## 2025-04-21 ENCOUNTER — TELEPHONE (OUTPATIENT)
Dept: MEDICAL GROUP | Facility: OTHER | Age: 55
End: 2025-04-21
Payer: COMMERCIAL

## 2025-04-30 ENCOUNTER — HOSPITAL ENCOUNTER (OUTPATIENT)
Facility: MEDICAL CENTER | Age: 55
End: 2025-04-30
Attending: UROLOGY
Payer: COMMERCIAL

## 2025-04-30 LAB
APPEARANCE UR: CLEAR
BILIRUB UR QL STRIP.AUTO: NEGATIVE
COLOR UR: YELLOW
GLUCOSE UR STRIP.AUTO-MCNC: NEGATIVE MG/DL
KETONES UR STRIP.AUTO-MCNC: NEGATIVE MG/DL
LEUKOCYTE ESTERASE UR QL STRIP.AUTO: NEGATIVE
MICRO URNS: NORMAL
NITRITE UR QL STRIP.AUTO: NEGATIVE
PH UR STRIP.AUTO: 5.5 [PH] (ref 5–8)
PROT UR QL STRIP: NEGATIVE MG/DL
RBC UR QL AUTO: NEGATIVE
SP GR UR STRIP.AUTO: 1.02
UROBILINOGEN UR STRIP.AUTO-MCNC: 0.2 EU/DL

## 2025-04-30 PROCEDURE — 87086 URINE CULTURE/COLONY COUNT: CPT

## 2025-04-30 PROCEDURE — 81003 URINALYSIS AUTO W/O SCOPE: CPT

## 2025-05-02 LAB
BACTERIA UR CULT: NORMAL
SIGNIFICANT IND 70042: NORMAL
SITE SITE: NORMAL
SOURCE SOURCE: NORMAL

## 2025-05-13 ENCOUNTER — APPOINTMENT (OUTPATIENT)
Dept: RADIOLOGY | Facility: OTHER | Age: 55
End: 2025-05-13
Attending: STUDENT IN AN ORGANIZED HEALTH CARE EDUCATION/TRAINING PROGRAM
Payer: COMMERCIAL

## 2025-05-13 ENCOUNTER — OFFICE VISIT (OUTPATIENT)
Dept: MEDICAL GROUP | Facility: OTHER | Age: 55
End: 2025-05-13
Payer: COMMERCIAL

## 2025-05-13 VITALS
BODY MASS INDEX: 26.74 KG/M2 | WEIGHT: 191 LBS | TEMPERATURE: 97.7 F | SYSTOLIC BLOOD PRESSURE: 130 MMHG | DIASTOLIC BLOOD PRESSURE: 90 MMHG | OXYGEN SATURATION: 97 % | HEIGHT: 71 IN | HEART RATE: 58 BPM

## 2025-05-13 DIAGNOSIS — G89.29 CHRONIC RIGHT SHOULDER PAIN: ICD-10-CM

## 2025-05-13 DIAGNOSIS — M54.2 NECK PAIN ON RIGHT SIDE: ICD-10-CM

## 2025-05-13 DIAGNOSIS — M25.511 CHRONIC RIGHT SHOULDER PAIN: ICD-10-CM

## 2025-05-13 DIAGNOSIS — M47.812 OSTEOARTHRITIS OF CERVICAL SPINE, UNSPECIFIED SPINAL OSTEOARTHRITIS COMPLICATION STATUS: ICD-10-CM

## 2025-05-13 PROCEDURE — 73030 X-RAY EXAM OF SHOULDER: CPT | Mod: TC,FY,RT | Performed by: FAMILY MEDICINE

## 2025-05-13 PROCEDURE — 72040 X-RAY EXAM NECK SPINE 2-3 VW: CPT | Mod: TC,FY | Performed by: FAMILY MEDICINE

## 2025-05-13 PROCEDURE — 3075F SYST BP GE 130 - 139MM HG: CPT | Mod: GC | Performed by: STUDENT IN AN ORGANIZED HEALTH CARE EDUCATION/TRAINING PROGRAM

## 2025-05-13 PROCEDURE — 3080F DIAST BP >= 90 MM HG: CPT | Mod: GC | Performed by: STUDENT IN AN ORGANIZED HEALTH CARE EDUCATION/TRAINING PROGRAM

## 2025-05-13 PROCEDURE — 99214 OFFICE O/P EST MOD 30 MIN: CPT | Mod: GC | Performed by: STUDENT IN AN ORGANIZED HEALTH CARE EDUCATION/TRAINING PROGRAM

## 2025-05-13 RX ORDER — PREDNISONE 50 MG/1
50 TABLET ORAL DAILY
Qty: 5 TABLET | Refills: 0 | Status: SHIPPED | OUTPATIENT
Start: 2025-05-13

## 2025-05-13 RX ORDER — MELOXICAM 15 MG/1
15 TABLET ORAL DAILY
Qty: 30 TABLET | Refills: 1 | Status: SHIPPED | OUTPATIENT
Start: 2025-05-13

## 2025-05-13 ASSESSMENT — FIBROSIS 4 INDEX: FIB4 SCORE: 1.15

## 2025-05-13 NOTE — PROGRESS NOTES
Subjective:   Bhargav Pozo is a 54 y.o. male here for the evaluation and management of Shoulder Pain (Right shoulder pain/burning)    HPI  Right shoulder pain  The patient reports that he has an achy/stabbing in the back of his shoulder for the past several years  No numbness   Denies any radiation   Worse with doing activity. 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 5 Tablet 0    meloxicam (MOBIC) 15 MG tablet Take 1 Tablet by mouth every day. 30 Tablet 1    tamsulosin (FLOMAX) 0.4 MG capsule Take 1 Capsule by mouth 1/2 hour after breakfast. (Patient not taking: Reported on 5/13/2025) 90 Capsule 3    SYMBICORT 80-4.5 MCG/ACT Aerosol INHALE 2 PUFFS BY MOUTH TWICE DAILY (Patient not taking: Reported on 5/13/2025) 20.4 g 2     No current facility-administered medications for this visit.       Codeine    Past Medical History:   Diagnosis Date    Anesthesia     PONV    Arthritis     osteo-hands    High cholesterol      Patient Active Problem List    Diagnosis Date Noted    Benign prostatic hyperplasia with urinary retention 06/12/2023    Prepatellar bursitis of right knee 04/07/2023    Seasonal allergies 04/07/2023    Subacute cough 04/07/2023    Low testosterone 04/07/2023    Screening for prostate cancer 04/07/2023    Preventative health care 04/07/2023    Hyperlipidemia 01/26/2021    PONV (postoperative nausea and vomiting) 01/26/2021       Past Surgical History  Past Surgical History:   Procedure Laterality Date    ORIF, WRIST Right 1/26/2021    Procedure: ORIF, WRIST - DISTAL RADIUS;  Surgeon: Pasha Lindsay M.D.;  Location: SURGERY [facility];  Service: Orthopedics       Social  "History     Socioeconomic History    Marital status:      Spouse name: Not on file    Number of children: Not on file    Years of education: Not on file    Highest education level: Not on file   Occupational History    Not on file   Tobacco Use    Smoking status: Never    Smokeless tobacco: Never   Vaping Use    Vaping status: Never Used   Substance and Sexual Activity    Alcohol use: Yes     Comment: 2 times per month    Drug use: Not Currently    Sexual activity: Not on file   Other Topics Concern    Not on file   Social History Narrative    Not on file     Social Drivers of Health     Financial Resource Strain: Not on file   Food Insecurity: Not on file   Transportation Needs: Not on file   Physical Activity: Not on file   Stress: Not on file   Social Connections: Not on file   Intimate Partner Violence: Not on file   Housing Stability: Not on file        Objective:     Vitals:    05/13/25 0830   BP: (!) 130/90   BP Location: Right arm   Patient Position: Sitting   Pulse: (!) 58   Temp: 36.5 °C (97.7 °F)   SpO2: 97%   Weight: 86.6 kg (191 lb)   Height: 1.791 m (5' 10.5\")     Body mass index is 27.02 kg/m².     Physical Exam  Right Shoulder  Inspection:  No gross deformity, no atrophy of supraspinatus or infraspinatus, no trevor deformity.  No swelling present, no ecchymosis. No scapular winging.  Palpation:  Non tender SC joint  Non tender AC joint  Non tender anterior GH joint  Non tender bicipital groove  Non tender posterior shoulder  Is to palpation over the right medial posterior trapezius    ROM:  Flexion 180, abduction 180, IR to T8, ER 65    Strength:  5/5 flexion, extension, abduction.  5/5 IR  5/5 ER    Neurovascular:  C5-T1 dermatomes intact to light touch, normal distal pulses/cap refill.  Reflexes: 1+ Biceps, 1+ Triceps,    Special tests  Negative Hawkin's  Negative Neer's  Negative Empty can.  Pain does reproduce at the posterior shoulder but not at the area of the supraspinatus  Negative " Jaeger's/active compression  Negative Lift off/ belly press    C spine: normal ROM flexion/extension, side bend and lateral rotation. Negative Spurling's.        Imaging:  X-rays of the cervical spine demonstrates multilevel disc arthropathy and narrowing of the vertebral spaces    X-ray of the right shoulder demonstrates no abnormalities    Ultrasound of the right shoulder demonstrates a small tear of the supraspinatus tendon but no pain with sono palpation.  Assessment and Plan:   Bhargav Pozo is a 54 y.o. male with a Shoulder Pain (Right shoulder pain/burning)     The following was discussed with the patient today.    1. Chronic right shoulder pain  - DX-Shoulder 3 Views RIGHT; Future  - DX-CERVICAL SPINE-2 OR 3 VIEWS; Future  - predniSONE (DELTASONE) 50 MG Tab; Take 1 Tablet by mouth every day.  Dispense: 5 Tablet; Refill: 0  - meloxicam (MOBIC) 15 MG tablet; Take 1 Tablet by mouth every day.  Dispense: 30 Tablet; Refill: 1    2. Neck pain on right side  - predniSONE (DELTASONE) 50 MG Tab; Take 1 Tablet by mouth every day.  Dispense: 5 Tablet; Refill: 0  - meloxicam (MOBIC) 15 MG tablet; Take 1 Tablet by mouth every day.  Dispense: 30 Tablet; Refill: 1    3. Osteoarthritis of cervical spine, unspecified spinal osteoarthritis complication status  - predniSONE (DELTASONE) 50 MG Tab; Take 1 Tablet by mouth every day.  Dispense: 5 Tablet; Refill: 0  - meloxicam (MOBIC) 15 MG tablet; Take 1 Tablet by mouth every day.  Dispense: 30 Tablet; Refill: 1    Plan:  -The patient symptoms are less likely secondary to shoulder pain and more likely secondary to degenerative disc disease of the cervical spine.  He has pain over the C3-4 C4-5 dermatome.  He does have a small tear at the supraspinatus tendon but this does not correlate to his area of pain.  -Trial prednisone 50 mg daily for 5 days.  Will follow with meloxicam 15 mg daily  - Consider MRI if symptoms fail to improve with stretching and strengthening exercises  and prednisone burst followed by meloxicam  - Depending on MRI results would consider cervical epidural   - Patient advised to return should he develop worsening radicular symtpoms    - Consider epidural if symptoms fail to improve     Followup: Return if symptoms worsen or fail to improve.    Michael Major MD   [facility]   Sports Medicine PGY-4     The patient was evaluated with attending physician Dr. Meier

## (undated) DEVICE — PROTECTOR ULNA NERVE - (36PR/CA)

## (undated) DEVICE — SPLINT ORTH 15FTX4IN PD STKNT - (ORTHO GLASS) OG-4L2 & OG-4L1 IS THE SAME PRODUCT.

## (undated) DEVICE — GLOVE SURGICAL PROTEXIS PI 8.0 LF - (50PR/BX)

## (undated) DEVICE — NEPTUNE 4 PORT MANIFOLD - (20/PK)

## (undated) DEVICE — ELECTRODE DUAL RETURN W/ CORD - (50/PK)

## (undated) DEVICE — HUMID-VENT HEAT AND MOISTURE EXCHANGE- (50/BX)

## (undated) DEVICE — TOURNIQUET CUFF 18 X 3 ONE PORT DISP - STERILE (10/BX)

## (undated) DEVICE — GOWN WARMING STANDARD FLEX - (30/CA)

## (undated) DEVICE — CHLORAPREP 26 ML APPLICATOR - ORANGE TINT(25/CA)

## (undated) DEVICE — SUTURE 3-0 ETHILON FS-1 - (36/BX) 30 INCH

## (undated) DEVICE — PACK UPPER EXTREMITY SM OR - (3/CA)

## (undated) DEVICE — DRESSING 3X8 ADAPTIC GAUZE - NON-ADHERING (36/PK 6PK/BX)

## (undated) DEVICE — SENSOR SPO2 NEO LNCS ADHESIVE (20/BX) SEE USER NOTES

## (undated) DEVICE — PADDING CAST 4 IN STERILE - 4 X 4 YDS (24/CA)

## (undated) DEVICE — NEEDLE W/FACET S TIP ECHOGENIC W/STIMULATION CABLE SONOPLEX II 21G X 4 (10EA/BX)"

## (undated) DEVICE — KIT ANESTHESIA W/CIRCUIT & 3/LT BAG W/FILTER (20EA/CA)

## (undated) DEVICE — SUCTION INSTRUMENT YANKAUER BULBOUS TIP W/O VENT (50EA/CA)

## (undated) DEVICE — SODIUM CHL IRRIGATION 0.9% 1000ML (12EA/CA)

## (undated) DEVICE — CANISTER SUCTION RIGID RED 1500CC (40EA/CA)

## (undated) DEVICE — BIT DRILL 2.7 SHORT W/QC - (2VDRX2=4)

## (undated) DEVICE — HEAD HOLDER JUNIOR/ADULT

## (undated) DEVICE — DRAPE LARGE 3 QUARTER - (20/CA)

## (undated) DEVICE — ELECTRODE 850 FOAM ADHESIVE - HYDROGEL RADIOTRNSPRNT (50/PK)

## (undated) DEVICE — SUTURE 2-0 VICRYL PLUS CT-2 - 27 INCH (36/BX)

## (undated) DEVICE — LACTATED RINGERS INJ 1000 ML - (14EA/CA 60CA/PF)

## (undated) DEVICE — GLOVE BIOGEL INDICATOR SZ 8.5 SURGICAL PF LTX - (50/BX 4BX/CA)

## (undated) DEVICE — SUTURE GENERAL

## (undated) DEVICE — DRILL BIT SN 2.0 SHORT W/QC - (2VDRX2=4)

## (undated) DEVICE — MASK ANESTHESIA ADULT  - (100/CA)

## (undated) DEVICE — DRAPE C-ARM LARGE 41IN X 74 IN - (10/BX 2BX/CA)

## (undated) DEVICE — TUBE CONNECTING SUCTION - CLEAR PLASTIC STERILE 72 IN (50EA/CA)

## (undated) DEVICE — MASK, LARYNGEAL AIRWAY #5